# Patient Record
Sex: FEMALE | Race: WHITE | Employment: FULL TIME | URBAN - METROPOLITAN AREA
[De-identification: names, ages, dates, MRNs, and addresses within clinical notes are randomized per-mention and may not be internally consistent; named-entity substitution may affect disease eponyms.]

---
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
## 2021-04-02 ENCOUNTER — HOSPITAL ENCOUNTER (EMERGENCY)
Age: 21
Discharge: HOME | End: 2021-04-02
Attending: EMERGENCY MEDICINE
Payer: COMMERCIAL

## 2021-04-02 VITALS
BODY MASS INDEX: 30.39 KG/M2 | HEART RATE: 92 BPM | SYSTOLIC BLOOD PRESSURE: 127 MMHG | RESPIRATION RATE: 18 BRPM | DIASTOLIC BLOOD PRESSURE: 86 MMHG | HEIGHT: 64 IN | WEIGHT: 178 LBS | TEMPERATURE: 98.6 F | OXYGEN SATURATION: 100 %

## 2021-04-02 DIAGNOSIS — N30.00 ACUTE CYSTITIS WITHOUT HEMATURIA: ICD-10-CM

## 2021-04-02 DIAGNOSIS — R10.84 ABDOMINAL PAIN, GENERALIZED: Primary | ICD-10-CM

## 2021-04-02 DIAGNOSIS — R11.2 NAUSEA AND VOMITING, INTRACTABILITY OF VOMITING NOT SPECIFIED, UNSPECIFIED VOMITING TYPE: ICD-10-CM

## 2021-04-02 LAB
ALBUMIN SERPL-MCNC: 3.4 G/DL (ref 3.5–5)
ALBUMIN/GLOB SERPL: 0.7 {RATIO} (ref 1.1–2.2)
ALP SERPL-CCNC: 86 U/L (ref 45–117)
ALT SERPL-CCNC: 11 U/L (ref 12–78)
ANION GAP SERPL CALC-SCNC: 8 MMOL/L (ref 5–15)
APPEARANCE UR: ABNORMAL
AST SERPL W P-5'-P-CCNC: 9 U/L (ref 15–37)
BACTERIA URNS QL MICRO: ABNORMAL /HPF
BASOPHILS # BLD: 0 K/UL (ref 0–0.1)
BASOPHILS NFR BLD: 0 % (ref 0–1)
BILIRUB SERPL-MCNC: 0.6 MG/DL (ref 0.2–1)
BILIRUB UR QL: NEGATIVE
BUN SERPL-MCNC: 8 MG/DL (ref 6–20)
BUN/CREAT SERPL: 11 (ref 12–20)
CA-I BLD-MCNC: 9.3 MG/DL (ref 8.5–10.1)
CHLORIDE SERPL-SCNC: 107 MMOL/L (ref 97–108)
CO2 SERPL-SCNC: 22 MMOL/L (ref 21–32)
COLOR UR: ABNORMAL
CREAT SERPL-MCNC: 0.74 MG/DL (ref 0.55–1.02)
DIFFERENTIAL METHOD BLD: ABNORMAL
EOSINOPHIL # BLD: 0.1 K/UL (ref 0–0.4)
EOSINOPHIL NFR BLD: 1 % (ref 0–7)
ERYTHROCYTE [DISTWIDTH] IN BLOOD BY AUTOMATED COUNT: 13.4 % (ref 11.5–14.5)
GLOBULIN SER CALC-MCNC: 4.8 G/DL (ref 2–4)
GLUCOSE SERPL-MCNC: 96 MG/DL (ref 65–100)
GLUCOSE UR STRIP.AUTO-MCNC: NEGATIVE MG/DL
HCG SERPL QL: NEGATIVE
HCT VFR BLD AUTO: 39 % (ref 35–47)
HGB BLD-MCNC: 12.7 G/DL (ref 11.5–16)
HGB UR QL STRIP: ABNORMAL
HYALINE CASTS URNS QL MICRO: ABNORMAL /LPF (ref 0–5)
IMM GRANULOCYTES # BLD AUTO: 0 K/UL (ref 0–0.04)
IMM GRANULOCYTES NFR BLD AUTO: 0 % (ref 0–0.5)
KETONES UR QL STRIP.AUTO: NEGATIVE MG/DL
LEUKOCYTE ESTERASE UR QL STRIP.AUTO: ABNORMAL
LIPASE SERPL-CCNC: 74 U/L (ref 73–393)
LYMPHOCYTES # BLD: 0.9 K/UL (ref 0.8–3.5)
LYMPHOCYTES NFR BLD: 10 % (ref 12–49)
MAGNESIUM SERPL-MCNC: 2 MG/DL (ref 1.6–2.4)
MCH RBC QN AUTO: 25.8 PG (ref 26–34)
MCHC RBC AUTO-ENTMCNC: 32.6 G/DL (ref 30–36.5)
MCV RBC AUTO: 79.1 FL (ref 80–99)
MONOCYTES # BLD: 0.9 K/UL (ref 0–1)
MONOCYTES NFR BLD: 10 % (ref 5–13)
MUCOUS THREADS URNS QL MICRO: ABNORMAL /LPF
NEUTS SEG # BLD: 7.6 K/UL (ref 1.8–8)
NEUTS SEG NFR BLD: 79 % (ref 32–75)
NITRITE UR QL STRIP.AUTO: NEGATIVE
PH UR STRIP: 5 [PH] (ref 5–8)
PLATELET # BLD AUTO: 344 K/UL (ref 150–400)
PMV BLD AUTO: 9.3 FL (ref 8.9–12.9)
POTASSIUM SERPL-SCNC: 3.6 MMOL/L (ref 3.5–5.1)
PROT SERPL-MCNC: 8.2 G/DL (ref 6.4–8.2)
PROT UR STRIP-MCNC: NEGATIVE MG/DL
RBC # BLD AUTO: 4.93 M/UL (ref 3.8–5.2)
RBC #/AREA URNS HPF: ABNORMAL /HPF (ref 0–5)
SODIUM SERPL-SCNC: 137 MMOL/L (ref 136–145)
SP GR UR REFRACTOMETRY: 1.03 (ref 1–1.03)
UROBILINOGEN UR QL STRIP.AUTO: 2 EU/DL (ref 0.1–1)
WBC # BLD AUTO: 9.6 K/UL (ref 3.6–11)
WBC URNS QL MICRO: ABNORMAL /HPF (ref 0–4)

## 2021-04-02 PROCEDURE — 74011250636 HC RX REV CODE- 250/636: Performed by: EMERGENCY MEDICINE

## 2021-04-02 PROCEDURE — 74011000250 HC RX REV CODE- 250: Performed by: EMERGENCY MEDICINE

## 2021-04-02 PROCEDURE — 99283 EMERGENCY DEPT VISIT LOW MDM: CPT

## 2021-04-02 PROCEDURE — 96375 TX/PRO/DX INJ NEW DRUG ADDON: CPT

## 2021-04-02 PROCEDURE — 83735 ASSAY OF MAGNESIUM: CPT

## 2021-04-02 PROCEDURE — 96374 THER/PROPH/DIAG INJ IV PUSH: CPT

## 2021-04-02 PROCEDURE — 85025 COMPLETE CBC W/AUTO DIFF WBC: CPT

## 2021-04-02 PROCEDURE — 83690 ASSAY OF LIPASE: CPT

## 2021-04-02 PROCEDURE — 80053 COMPREHEN METABOLIC PANEL: CPT

## 2021-04-02 PROCEDURE — 84703 CHORIONIC GONADOTROPIN ASSAY: CPT

## 2021-04-02 PROCEDURE — 81001 URINALYSIS AUTO W/SCOPE: CPT

## 2021-04-02 PROCEDURE — 36415 COLL VENOUS BLD VENIPUNCTURE: CPT

## 2021-04-02 RX ORDER — CEPHALEXIN 500 MG/1
500 CAPSULE ORAL 4 TIMES DAILY
Qty: 28 CAP | Refills: 0 | Status: SHIPPED | OUTPATIENT
Start: 2021-04-02 | End: 2021-04-09

## 2021-04-02 RX ORDER — KETOROLAC TROMETHAMINE 30 MG/ML
30 INJECTION, SOLUTION INTRAMUSCULAR; INTRAVENOUS
Status: COMPLETED | OUTPATIENT
Start: 2021-04-02 | End: 2021-04-02

## 2021-04-02 RX ORDER — ONDANSETRON 4 MG/1
4 TABLET, ORALLY DISINTEGRATING ORAL
Qty: 10 TAB | Refills: 0 | Status: SHIPPED | OUTPATIENT
Start: 2021-04-02

## 2021-04-02 RX ORDER — ONDANSETRON 2 MG/ML
4 INJECTION INTRAMUSCULAR; INTRAVENOUS
Status: COMPLETED | OUTPATIENT
Start: 2021-04-02 | End: 2021-04-02

## 2021-04-02 RX ADMIN — CEFTRIAXONE SODIUM 1 G: 1 INJECTION, POWDER, FOR SOLUTION INTRAMUSCULAR; INTRAVENOUS at 08:31

## 2021-04-02 RX ADMIN — ONDANSETRON 4 MG: 2 INJECTION INTRAMUSCULAR; INTRAVENOUS at 06:35

## 2021-04-02 RX ADMIN — SODIUM CHLORIDE 1000 ML: 9 INJECTION, SOLUTION INTRAVENOUS at 06:35

## 2021-04-02 RX ADMIN — KETOROLAC TROMETHAMINE 30 MG: 30 INJECTION, SOLUTION INTRAMUSCULAR at 06:35

## 2021-04-02 NOTE — ED PROVIDER NOTES
EMERGENCY DEPARTMENT HISTORY AND PHYSICAL EXAM      Date: 4/2/2021  Patient Name: Mick Martin    History of Presenting Illness     Chief Complaint   Patient presents with    Vomiting       History Provided By: Patient    HPI: Mick Martin, 21 y.o. female with PMHx as noted below presents the emergency department chief complaint abdominal pain, nausea vomiting. Patient states she developed diffuse cramping abdominal pain yesterday evening. Pain is been constant but waxes and wanes in intensity. Pain does not radiate and there are no relieving or exacerbating factors to her pain. Patient notes persistent nausea and vomiting and inability to tolerate p.o. since yesterday. Patient is denying any diarrhea, urinary symptoms, fevers, chills. PCP: Lorraine Medina NP    Current Outpatient Medications   Medication Sig Dispense Refill    ondansetron (Zofran ODT) 4 mg disintegrating tablet Take 1 Tab by mouth every eight (8) hours as needed for Nausea. 10 Tab 0    ketorolac (TORADOL) 10 mg tablet Take 1 Tab by mouth every six (6) hours as needed for Pain. Indications: excruciating lower back pain from kidney stones 20 Tab 0    phenazopyridine (PYRIDIUM) 200 mg tablet Take 1 Tab by mouth three (3) times daily as needed for Pain. 20 Tab 1    dextroamphetamine/amphetamine (ADDERALL PO) Take  by mouth. Past History     Past Medical History:  History reviewed. No pertinent past medical history. Past Surgical History:  History reviewed. No pertinent surgical history. Family History:  History reviewed. No pertinent family history. Social History:  Social History     Tobacco Use    Smoking status: Never Smoker    Smokeless tobacco: Never Used   Substance Use Topics    Alcohol use: Never     Frequency: Never    Drug use: Never       Allergies:  No Known Allergies      Review of Systems   Review of Systems  Constitutional: Negative for fever, chills, and fatigue.    HENT: Negative for congestion, sore throat, rhinorrhea, sneezing and neck stiffness   Eyes: Negative for discharge and redness. Respiratory: Negative for  shortness of breath, wheezing   Cardiovascular: Negative for chest pain, palpitations   Gastrointestinal: Positive for nausea, vomiting, abdominal pain. Negative constipation, diarrhea and blood in stool. Genitourinary: Negative for dysuria, hematuria, flank pain, decreased urine volume, discharge,   Musculoskeletal: Negative for myalgias or joint pain . Skin: Negative for rash or lesions . Neurological: Negative weakness, light-headedness, numbness and headaches. Physical Exam   Physical Exam    GENERAL: alert and oriented, no acute distress  EYES: PEERL, No injection, discharge or icterus. ENT: Mucous membranes pink and moist.  NECK: Supple  LUNGS: Airway patent. Non-labored respirations. Breath sounds clear with good air entry bilaterally. HEART: Tachycardic, regular rhythm. . No peripheral edema  ABDOMEN: Non-distended and non-tender, without guarding or rebound. SKIN:  warm, dry  MSK/EXTREMITIES: Without swelling, tenderness or deformity, symmetric with normal ROM  NEUROLOGICAL: Alert, oriented      Diagnostic Study Results     Labs -  Reviewed    Radiologic Studies -   No orders to display     CT Results  (Last 48 hours)    None        CXR Results  (Last 48 hours)    None            Medical Decision Making     ICorey MD am the first provider for this patient and am the attending of record for this patient encounter. I reviewed the vital signs, available nursing notes, past medical history, past surgical history, family history and social history. Vital Signs-Reviewed the patient's vital signs.   Patient Vitals for the past 12 hrs:   Temp Pulse Resp BP SpO2   04/02/21 0559 98.6 °F (37 °C) (!) 126 18 127/79 98 %       Records Reviewed: Nursing Notes and Old Medical Records    Provider Notes (Medical Decision Making):   Patient presents to the emergency room for abd pain, vomiting and diarrhea. On presentation, the patient is well appearing, in no acute distress with normal vital signs Differential diagnosis considered includes gastroenteritis, hyperemesis, ectopic pregnancy, gastritis, enteritis, inflammatory bowel disease, bowel obstruction, pancreatitis, cholecystitis, appendicitis,  diverticulitis, cyclic vomiting syndrome, or a food-borne illness. Abdominal examination was soft and benign so no concern for any acute surgical issues at presentation. Clinical history, examination, and work-up are consistent with a likely self-limited gastrointestinal process. .      Patient was provided IV hydration and anti-emetics with overall improvement in symptoms. The patient was able to tolerate oral intake prior to discharge. me.  Patient is encouraged to maintain hydration. A prescription for anti-emetics was provided. CT scan was not felt to be warranted at this time as exam remained benign on repeat. .  Patient should return for severe pain, intractable vomiting, fevers, bloody stools, or dehydration. The patient understood the diagnosis and treatment and had no further questions. Patient should call a primary care physician for follow-up, and was otherwise deemed stable for discharge to home. ED Course:   Initial assessment performed. The patients presenting problems have been discussed, and they are in agreement with the care plan formulated and outlined with them. I have encouraged them to ask questions as they arise throughout their visit. PROGRESS  Judi Jenna Fernandez's  results have been reviewed with her. She has been counseled regarding her diagnosis. She verbally conveys understanding and agreement of the signs, symptoms, diagnosis, treatment and prognosis and additionally agrees to follow up as recommended with Dr. Maria Esther Farley NP in 24 - 48 hours. She also agrees with the care-plan and conveys that all of her questions have been answered. I have also put together some discharge instructions for her that include: 1) educational information regarding their diagnosis, 2) how to care for their diagnosis at home, as well a 3) list of reasons why they would want to return to the ED prior to their follow-up appointment, should their condition change. Disposition:  home    PLAN:  1. Current Discharge Medication List      CONTINUE these medications which have CHANGED    Details   ondansetron (Zofran ODT) 4 mg disintegrating tablet Take 1 Tab by mouth every eight (8) hours as needed for Nausea. Qty: 10 Tab, Refills: 0           2. Follow-up Information     Follow up With Specialties Details Why [address] [facility] EMERGENCY DEPT Emergency Medicine  If symptoms worsen [address] [facility] [address] [address] [place] [address], [facility]5 [address]  [phone]          Return to ED if worse     Diagnosis     Clinical Impression:   1. Abdominal pain, generalized    2. Nausea and vomiting, intractability of vomiting not specified, unspecified vomiting type        Please note that this dictation was completed with Dragon, computer voice recognition software. Quite often unanticipated grammatical, syntax, homophones, and other interpretive errors are inadvertently transcribed by the computer software. Please disregard these errors. Additionally, please excuse any errors that have escaped final proofreading.

## 2021-04-02 NOTE — ED TRIAGE NOTES
Patient states that she has nausea and vomiting for the last day, states that she has been non stop vomiting since 2200 last night. Also reports abdominal pain but denies diarrhea.

## 2021-04-02 NOTE — ED NOTES
[facility] 15 all medications, side effects and f/u appts explained with pt's verbal understanding given. All personal belongings taken.

## 2021-04-08 ENCOUNTER — TRANSCRIBE ORDER (OUTPATIENT)
Dept: SCHEDULING | Age: 21
End: 2021-04-08

## 2021-04-08 DIAGNOSIS — N39.0 UTI (URINARY TRACT INFECTION): Primary | ICD-10-CM

## 2023-03-03 ENCOUNTER — TELEPHONE (OUTPATIENT)
Dept: OBGYN CLINIC | Age: 23
End: 2023-03-03

## 2023-03-03 NOTE — TELEPHONE ENCOUNTER
Patient given information regarding [id number] and contacting her insurance to see if the test is covered. She was advised if it is she may discuss this with Dr Neftali Simeon at her visit.

## 2023-03-22 ENCOUNTER — INITIAL PRENATAL (OUTPATIENT)
Dept: OBGYN CLINIC | Age: 23
End: 2023-03-22

## 2023-03-22 VITALS
WEIGHT: 215.13 LBS | SYSTOLIC BLOOD PRESSURE: 138 MMHG | DIASTOLIC BLOOD PRESSURE: 87 MMHG | HEIGHT: 64 IN | BODY MASS INDEX: 36.73 KG/M2

## 2023-03-22 DIAGNOSIS — N91.2 AMENORRHEA: ICD-10-CM

## 2023-03-22 DIAGNOSIS — Z11.3 SCREENING FOR VENEREAL DISEASE: ICD-10-CM

## 2023-03-22 DIAGNOSIS — Z3A.11 11 WEEKS GESTATION OF PREGNANCY: ICD-10-CM

## 2023-03-22 DIAGNOSIS — F90.0 ATTENTION DEFICIT HYPERACTIVITY DISORDER (ADHD), PREDOMINANTLY INATTENTIVE TYPE: ICD-10-CM

## 2023-03-22 DIAGNOSIS — Z11.51 SCREENING FOR HUMAN PAPILLOMAVIRUS: Primary | ICD-10-CM

## 2023-03-22 DIAGNOSIS — Z34.91 NORMAL PREGNANCY IN FIRST TRIMESTER: ICD-10-CM

## 2023-03-22 PROBLEM — Z34.90 PREGNANCY: Status: ACTIVE | Noted: 2023-03-22

## 2023-03-22 PROBLEM — Z34.90 NORMAL PREGNANCY: Status: ACTIVE | Noted: 2023-03-22

## 2023-03-22 RX ORDER — DEXTROAMPHETAMINE SACCHARATE, AMPHETAMINE ASPARTATE MONOHYDRATE, DEXTROAMPHETAMINE SULFATE AND AMPHETAMINE SULFATE 3.75; 3.75; 3.75; 3.75 MG/1; MG/1; MG/1; MG/1
15 CAPSULE, EXTENDED RELEASE ORAL
Qty: 30 CAPSULE | Refills: 0 | Status: SHIPPED | OUTPATIENT
Start: 2023-03-22

## 2023-03-22 NOTE — PROGRESS NOTES
Ketones:NEG // SG:NEG //  Cristhian Panning //  Pro:NEG //  Nit:NEG // Leuk: NEG   Pt presents for NEW OB with complaints of occasional nausea and heartburn//OB PAP, OB PANEL, [id number] TODAY//Aj

## 2023-03-23 LAB
ABO GROUP BLD: NORMAL
BASOPHILS # BLD AUTO: 0 X10E3/UL (ref 0–0.2)
BASOPHILS NFR BLD AUTO: 0 %
BLD GP AB SCN SERPL QL: NEGATIVE
EOSINOPHIL # BLD AUTO: 0.2 X10E3/UL (ref 0–0.4)
EOSINOPHIL NFR BLD AUTO: 2 %
ERYTHROCYTE [DISTWIDTH] IN BLOOD BY AUTOMATED COUNT: 12.5 % (ref 11.7–15.4)
HBV SURFACE AG SERPL QL IA: NEGATIVE
HCT VFR BLD AUTO: 38.2 % (ref 34–46.6)
HCV AB SERPL QL IA: NORMAL
HCV IGG SERPL QL IA: NON REACTIVE
HGB BLD-MCNC: 12.6 G/DL (ref 11.1–15.9)
HIV 1+2 AB+HIV1 P24 AG SERPL QL IA: NON REACTIVE
IMM GRANULOCYTES # BLD AUTO: 0.1 X10E3/UL (ref 0–0.1)
IMM GRANULOCYTES NFR BLD AUTO: 1 %
LYMPHOCYTES # BLD AUTO: 1.8 X10E3/UL (ref 0.7–3.1)
LYMPHOCYTES NFR BLD AUTO: 17 %
MCH RBC QN AUTO: 26.6 PG (ref 26.6–33)
MCHC RBC AUTO-ENTMCNC: 33 G/DL (ref 31.5–35.7)
MCV RBC AUTO: 81 FL (ref 79–97)
MONOCYTES # BLD AUTO: 0.8 X10E3/UL (ref 0.1–0.9)
MONOCYTES NFR BLD AUTO: 7 %
NEUTROPHILS # BLD AUTO: 7.5 X10E3/UL (ref 1.4–7)
NEUTROPHILS NFR BLD AUTO: 73 %
PLATELET # BLD AUTO: 368 X10E3/UL (ref 150–450)
RBC # BLD AUTO: 4.73 X10E6/UL (ref 3.77–5.28)
RH BLD: POSITIVE
RPR SER QL: NON REACTIVE
RUBV IGG SERPL IA-ACNC: 1.21 INDEX
WBC # BLD AUTO: 10.2 X10E3/UL (ref 3.4–10.8)

## 2023-03-27 LAB
C TRACH RRNA CVX QL NAA+PROBE: NEGATIVE
CYTOLOGIST CVX/VAG CYTO: NORMAL
CYTOLOGY CVX/VAG DOC CYTO: NORMAL
CYTOLOGY CVX/VAG DOC THIN PREP: NORMAL
DX ICD CODE: NORMAL
LABCORP, 190119: NORMAL
Lab: NORMAL
N GONORRHOEA RRNA CVX QL NAA+PROBE: NEGATIVE
OTHER STN SPEC: NORMAL
STAT OF ADQ CVX/VAG CYTO-IMP: NORMAL
T VAGINALIS RRNA SPEC QL NAA+PROBE: NEGATIVE

## 2023-03-28 LAB

## 2023-04-23 DIAGNOSIS — N91.2 AMENORRHEA: Primary | ICD-10-CM

## 2023-04-25 ENCOUNTER — ROUTINE PRENATAL (OUTPATIENT)
Dept: OBGYN CLINIC | Age: 23
End: 2023-04-25
Payer: COMMERCIAL

## 2023-04-25 VITALS
BODY MASS INDEX: 36.6 KG/M2 | SYSTOLIC BLOOD PRESSURE: 146 MMHG | WEIGHT: 214.38 LBS | HEIGHT: 64 IN | DIASTOLIC BLOOD PRESSURE: 89 MMHG

## 2023-04-25 DIAGNOSIS — O21.9 NAUSEA AND VOMITING IN PREGNANCY: Primary | ICD-10-CM

## 2023-04-25 DIAGNOSIS — Z34.92 NORMAL PREGNANCY IN SECOND TRIMESTER: ICD-10-CM

## 2023-04-25 DIAGNOSIS — G43.509 PERSISTENT MIGRAINE AURA WITHOUT CEREBRAL INFARCTION AND WITHOUT STATUS MIGRAINOSUS, NOT INTRACTABLE: ICD-10-CM

## 2023-04-25 DIAGNOSIS — Z3A.16 16 WEEKS GESTATION OF PREGNANCY: ICD-10-CM

## 2023-04-25 DIAGNOSIS — O21.9 NAUSEA AND VOMITING IN PREGNANCY: ICD-10-CM

## 2023-04-25 PROCEDURE — 0502F SUBSEQUENT PRENATAL CARE: CPT | Performed by: OBSTETRICS & GYNECOLOGY

## 2023-04-25 RX ORDER — SCOLOPAMINE TRANSDERMAL SYSTEM 1 MG/1
1 PATCH, EXTENDED RELEASE TRANSDERMAL
Qty: 5 PATCH | Refills: 2 | Status: SHIPPED | OUTPATIENT
Start: 2023-04-25 | End: 2023-04-25 | Stop reason: SDUPTHER

## 2023-04-25 RX ORDER — BUTALBITAL, ACETAMINOPHEN AND CAFFEINE 50; 325; 40 MG/1; MG/1; MG/1
1 TABLET ORAL
Qty: 30 TABLET | Refills: 0 | Status: SHIPPED | OUTPATIENT
Start: 2023-04-25 | End: 2023-04-25 | Stop reason: SDUPTHER

## 2023-04-25 RX ORDER — PROMETHAZINE HYDROCHLORIDE 25 MG/1
25 TABLET ORAL
Qty: 30 TABLET | Refills: 3 | Status: SHIPPED | OUTPATIENT
Start: 2023-04-25 | End: 2023-04-25 | Stop reason: SDUPTHER

## 2023-04-25 RX ORDER — BUTALBITAL, ACETAMINOPHEN AND CAFFEINE 50; 325; 40 MG/1; MG/1; MG/1
1 TABLET ORAL
Qty: 30 TABLET | Refills: 0 | Status: SHIPPED | OUTPATIENT
Start: 2023-04-25

## 2023-04-25 RX ORDER — PROMETHAZINE HYDROCHLORIDE 25 MG/1
25 TABLET ORAL
Qty: 30 TABLET | Refills: 3 | Status: SHIPPED | OUTPATIENT
Start: 2023-04-25

## 2023-04-25 RX ORDER — SCOLOPAMINE TRANSDERMAL SYSTEM 1 MG/1
1 PATCH, EXTENDED RELEASE TRANSDERMAL
Qty: 5 PATCH | Refills: 2 | Status: SHIPPED | OUTPATIENT
Start: 2023-04-25

## 2023-04-25 NOTE — PROGRESS NOTES
Ketones:NEG // SG:NEG //  Merrill Meek //  Pro:NEG //  Nit:NEG // Leuk: NEG   Pt presents with complaints of round ligament pain, sciatica pain and daily headaches with nausea and vomiting//[id number] GALLITO//Aj

## 2023-05-18 DIAGNOSIS — O99.212 OBESITY AFFECTING PREGNANCY IN SECOND TRIMESTER: Primary | ICD-10-CM

## 2023-05-21 ENCOUNTER — HOSPITAL ENCOUNTER (EMERGENCY)
Facility: HOSPITAL | Age: 23
Discharge: HOME | End: 2023-05-21
Payer: COMMERCIAL

## 2023-05-21 VITALS
RESPIRATION RATE: 20 BRPM | HEIGHT: 64 IN | TEMPERATURE: 97.9 F | WEIGHT: 214 LBS | OXYGEN SATURATION: 98 % | BODY MASS INDEX: 36.54 KG/M2 | HEART RATE: 125 BPM | DIASTOLIC BLOOD PRESSURE: 81 MMHG | SYSTOLIC BLOOD PRESSURE: 138 MMHG

## 2023-05-21 DIAGNOSIS — J40 BRONCHITIS: Primary | ICD-10-CM

## 2023-05-21 DIAGNOSIS — J30.1 SEASONAL ALLERGIC RHINITIS DUE TO POLLEN: ICD-10-CM

## 2023-05-21 DIAGNOSIS — Z3A.20 20 WEEKS GESTATION OF PREGNANCY: ICD-10-CM

## 2023-05-21 PROCEDURE — 6360000002 HC RX W HCPCS: Performed by: NURSE PRACTITIONER

## 2023-05-21 PROCEDURE — 99284 EMERGENCY DEPT VISIT MOD MDM: CPT

## 2023-05-21 PROCEDURE — 96372 THER/PROPH/DIAG INJ SC/IM: CPT

## 2023-05-21 PROCEDURE — 6370000000 HC RX 637 (ALT 250 FOR IP): Performed by: NURSE PRACTITIONER

## 2023-05-21 RX ORDER — CEFTRIAXONE 1 G/1
1000 INJECTION, POWDER, FOR SOLUTION INTRAMUSCULAR; INTRAVENOUS ONCE
Status: COMPLETED | OUTPATIENT
Start: 2023-05-21 | End: 2023-05-21

## 2023-05-21 RX ORDER — AZITHROMYCIN 250 MG/1
TABLET, FILM COATED ORAL
Qty: 6 TABLET | Refills: 0 | Status: SHIPPED | OUTPATIENT
Start: 2023-05-21

## 2023-05-21 RX ORDER — PREDNISONE 20 MG/1
20 TABLET ORAL 2 TIMES DAILY
Qty: 10 TABLET | Refills: 0 | Status: SHIPPED | OUTPATIENT
Start: 2023-05-21 | End: 2023-05-26

## 2023-05-21 RX ORDER — IPRATROPIUM BROMIDE AND ALBUTEROL SULFATE 2.5; .5 MG/3ML; MG/3ML
1 SOLUTION RESPIRATORY (INHALATION)
Status: COMPLETED | OUTPATIENT
Start: 2023-05-21 | End: 2023-05-21

## 2023-05-21 RX ORDER — DEXAMETHASONE SODIUM PHOSPHATE 10 MG/ML
10 INJECTION, SOLUTION INTRAMUSCULAR; INTRAVENOUS ONCE
Status: COMPLETED | OUTPATIENT
Start: 2023-05-21 | End: 2023-05-21

## 2023-05-21 RX ORDER — DEXTROMETHORPHAN HYDROBROMIDE AND PROMETHAZINE HYDROCHLORIDE 15; 6.25 MG/5ML; MG/5ML
10 SYRUP ORAL
Qty: 70 ML | Refills: 0 | Status: SHIPPED | OUTPATIENT
Start: 2023-05-21 | End: 2023-05-28

## 2023-05-21 RX ORDER — CETIRIZINE HYDROCHLORIDE 10 MG/1
10 TABLET ORAL DAILY
Qty: 30 TABLET | Refills: 0 | Status: SHIPPED | OUTPATIENT
Start: 2023-05-21 | End: 2023-06-20

## 2023-05-21 RX ORDER — ALBUTEROL SULFATE 90 UG/1
2 AEROSOL, METERED RESPIRATORY (INHALATION) EVERY 6 HOURS PRN
Qty: 18 G | Refills: 3 | Status: SHIPPED | OUTPATIENT
Start: 2023-05-21

## 2023-05-21 RX ORDER — FLUTICASONE PROPIONATE 50 MCG
2 SPRAY, SUSPENSION (ML) NASAL DAILY
Qty: 16 G | Refills: 0 | Status: SHIPPED | OUTPATIENT
Start: 2023-05-21

## 2023-05-21 RX ADMIN — DEXAMETHASONE SODIUM PHOSPHATE 10 MG: 10 INJECTION, SOLUTION INTRAMUSCULAR; INTRAVENOUS at 20:44

## 2023-05-21 RX ADMIN — CEFTRIAXONE SODIUM 1000 MG: 1 INJECTION, POWDER, FOR SOLUTION INTRAMUSCULAR; INTRAVENOUS at 20:56

## 2023-05-21 RX ADMIN — IPRATROPIUM BROMIDE AND ALBUTEROL SULFATE 1 AMPULE: 2.5; .5 SOLUTION RESPIRATORY (INHALATION) at 20:44

## 2023-05-21 ASSESSMENT — PAIN - FUNCTIONAL ASSESSMENT: PAIN_FUNCTIONAL_ASSESSMENT: NONE - DENIES PAIN

## 2023-05-22 ENCOUNTER — TELEPHONE (OUTPATIENT)
Age: 23
End: 2023-05-22

## 2023-05-22 NOTE — ED PROVIDER NOTES
[facility] EMERGENCY DEPARTMENT  EMERGENCY DEPARTMENT HISTORY AND PHYSICAL EXAM      Date: 5/21/2023  Patient Name: Ambrose Wallis  MRN: [id number]  [place]: 2000  Date of evaluation: 5/21/2023  Provider: MARTITA Harris NP   Note Started: 9:28 PM EDT 5/21/23    HISTORY OF PRESENT ILLNESS     Chief Complaint   Patient presents with    Cough    Nasal Congestion       History Provided By: Patient    HPI: Ambrose Wallis is a 25 y.o. female ***    PAST MEDICAL HISTORY   Past Medical History:  Past Medical History:   Diagnosis Date    Calculus of kidney     Headache     PCOS (polycystic ovarian syndrome)        Past Surgical History:  History reviewed. No pertinent surgical history. Family History:  History reviewed. No pertinent family history. Social History:  Social History     Tobacco Use    Smoking status: Never    Smokeless tobacco: Never   Substance Use Topics    Alcohol use: Never    Drug use: Never       Allergies:  No Known Allergies    PCP: MARTITA Alford NP    Current Meds:   No current facility-administered medications for this encounter.      Current Outpatient Medications   Medication Sig Dispense Refill    predniSONE (DELTASONE) 20 MG tablet Take 1 tablet by mouth 2 times daily for 5 days 10 tablet 0    azithromycin (ZITHROMAX) 250 MG tablet 2 tablets today, then 1 tablet daily 6 tablet 0    albuterol sulfate HFA (PROVENTIL HFA) 108 (90 Base) MCG/ACT inhaler Inhale 2 puffs into the lungs every 6 hours as needed for Wheezing 18 g 3    promethazine-dextromethorphan (PROMETHAZINE-DM) 6.25-15 MG/5ML syrup Take 10 mLs by mouth nightly for 7 days 70 mL 0    fluticasone (FLONASE) 50 MCG/ACT nasal spray 2 sprays by Each Nostril route daily 16 g 0    cetirizine (ZYRTEC) 10 MG tablet Take 1 tablet by mouth daily 30 tablet 0    ketorolac (TORADOL) 10 MG tablet Take 10 mg by mouth every 6 hours as needed      ondansetron (ZOFRAN-ODT) 4 MG disintegrating tablet Take 4 mg by mouth every 8 hours as

## 2023-05-22 NOTE — TELEPHONE ENCOUNTER
I called the patient on 05/22 @3:34 PM. We need to cancel her Ultrasound Anatomy appointment in the office as she needs to see MFM.  Her Mailbox is full and unable to get in contact with the patient

## 2023-05-24 ENCOUNTER — HOSPITAL ENCOUNTER (OUTPATIENT)
Facility: HOSPITAL | Age: 23
Discharge: HOME | End: 2023-05-27
Payer: COMMERCIAL

## 2023-05-24 PROCEDURE — 76811 OB US DETAILED SNGL FETUS: CPT | Performed by: OBSTETRICS & GYNECOLOGY

## 2023-05-24 NOTE — PROCEDURES
Indication  ========    Obesity, BMI 36    Method  ======    Transabdominal ultrasound examination. View: Sufficient    Dating  ======    Previous Ultrasound on: 3/22/2023  Type of prior assessment: GA  GA at prior assessment date 11 w + 3 d  GA by previous U/S 20 w + 3 d  ANGEL by previous Ultrasound: 10/8/2023  Ultrasound examination on: 5/24/2023  GA by U/S based upon: [facility], BPD, Femur, HC  GA by U/S 20 w + 5 d  ANGEL by U/S: 10/6/2023  Assigned: based on ultrasound (GA), selected on 05/24/2023  Assigned GA 20 w + 3 d  Assigned ANGEL: 10/8/2023    Fetal Biometry  ============    Standard  BPD 47.4 mm 20w 2d 45% Hadlock  OFD 64.2 mm 21w 6d 90% Nelli  .0 mm 20w 2d 38% Hadlock  Cerebellum tr 21.8 mm 20w 4d 74% Hill  Nuchal fold 3.1 mm  .2 mm 21w 0d 62% Hadlock  Femur 35.3 mm 21w 1d 67% Hadlock  Humerus 33.8 mm 21w 3d 85% Nelli   g 20w 6d 72% Hadlock  EFW (lb) 0 lb  EFW (oz) 14 oz  EFW by: Hadlock (BPD-HC-AC-FL)  Extended   7.1 mm  CM 5.9 mm  74% Nicolaides  Head / Face / Neck  Nasal bone: present  Other Structures   bpm    General Evaluation  ==============    Cardiac activity present.  bpm. Fetal movements: visualized. Presentation: transverse  Placenta: Placental site: posterior  Umbilical cord: Cord vessels: 3 vessel cord. Insertion site: normal insertion  Amniotic fluid: Amount of AF: normal amount.  MVP 5.1 cm    Fetal Anatomy  ===========    Cranium: normal  Lateral ventricles: normal  Choroid plexus: normal  Midline falx: normal  Cavum septi pellucidi: normal  Cerebellum: normal  Cisterna magna: normal  Head / Neck  Neck: suboptimal  Lips: normal  Profile: suboptimal  Nose: normal  Face  Coronal face: normal  Nasal bone: present  Palate: normal  Maxilla: normal  Mandible: normal  Orbits: normal  4-chamber view: not visualized  RVOT view: not visualized  LVOT view: not visualized  3-vessel view: not visualized  3-vessel-trachea view: not visualized  Heart / Thorax  Aortic arch

## 2023-05-30 ASSESSMENT — ENCOUNTER SYMPTOMS
ALLERGIC/IMMUNOLOGIC NEGATIVE: 1
CHEST TIGHTNESS: 0
SHORTNESS OF BREATH: 0
EYES NEGATIVE: 1
COUGH: 1
SINUS PAIN: 1
WHEEZING: 0
SORE THROAT: 1
ABDOMINAL PAIN: 0
GASTROINTESTINAL NEGATIVE: 1
RHINORRHEA: 1

## 2023-05-31 RX ORDER — DEXTROAMPHETAMINE SACCHARATE, AMPHETAMINE ASPARTATE MONOHYDRATE, DEXTROAMPHETAMINE SULFATE AND AMPHETAMINE SULFATE 3.75; 3.75; 3.75; 3.75 MG/1; MG/1; MG/1; MG/1
CAPSULE, EXTENDED RELEASE ORAL
COMMUNITY
Start: 2023-03-22

## 2023-05-31 RX ORDER — SCOLOPAMINE TRANSDERMAL SYSTEM 1 MG/1
PATCH, EXTENDED RELEASE TRANSDERMAL
COMMUNITY
Start: 2023-04-25

## 2023-05-31 RX ORDER — PROMETHAZINE HYDROCHLORIDE 25 MG/1
TABLET ORAL
COMMUNITY
Start: 2023-04-25

## 2023-05-31 RX ORDER — BUTALBITAL, ACETAMINOPHEN AND CAFFEINE 50; 325; 40 MG/1; MG/1; MG/1
TABLET ORAL
COMMUNITY
Start: 2023-04-25

## 2023-05-31 SDOH — ECONOMIC STABILITY: HOUSING INSECURITY
IN THE LAST 12 MONTHS, WAS THERE A TIME WHEN YOU DID NOT HAVE A STEADY PLACE TO SLEEP OR SLEPT IN A SHELTER (INCLUDING NOW)?: NO

## 2023-05-31 SDOH — ECONOMIC STABILITY: FOOD INSECURITY: WITHIN THE PAST 12 MONTHS, YOU WORRIED THAT YOUR FOOD WOULD RUN OUT BEFORE YOU GOT MONEY TO BUY MORE.: NEVER TRUE

## 2023-05-31 SDOH — ECONOMIC STABILITY: TRANSPORTATION INSECURITY
IN THE PAST 12 MONTHS, HAS LACK OF TRANSPORTATION KEPT YOU FROM MEETINGS, WORK, OR FROM GETTING THINGS NEEDED FOR DAILY LIVING?: NO

## 2023-05-31 SDOH — ECONOMIC STABILITY: INCOME INSECURITY: HOW HARD IS IT FOR YOU TO PAY FOR THE VERY BASICS LIKE FOOD, HOUSING, MEDICAL CARE, AND HEATING?: NOT VERY HARD

## 2023-05-31 SDOH — ECONOMIC STABILITY: FOOD INSECURITY: WITHIN THE PAST 12 MONTHS, THE FOOD YOU BOUGHT JUST DIDN'T LAST AND YOU DIDN'T HAVE MONEY TO GET MORE.: NEVER TRUE

## 2023-06-01 ENCOUNTER — ROUTINE PRENATAL (OUTPATIENT)
Age: 23
End: 2023-06-01

## 2023-06-01 VITALS
SYSTOLIC BLOOD PRESSURE: 145 MMHG | WEIGHT: 226.25 LBS | BODY MASS INDEX: 38.63 KG/M2 | DIASTOLIC BLOOD PRESSURE: 84 MMHG | HEIGHT: 64 IN

## 2023-06-01 DIAGNOSIS — O09.92 HIGH-RISK PREGNANCY IN SECOND TRIMESTER: ICD-10-CM

## 2023-06-01 DIAGNOSIS — Z3A.21 21 WEEKS GESTATION OF PREGNANCY: ICD-10-CM

## 2023-06-01 DIAGNOSIS — O16.2 HYPERTENSION AFFECTING PREGNANCY IN SECOND TRIMESTER: Primary | ICD-10-CM

## 2023-06-01 PROCEDURE — 0502F SUBSEQUENT PRENATAL CARE: CPT | Performed by: OBSTETRICS & GYNECOLOGY

## 2023-06-01 NOTE — PROGRESS NOTES
Ketones:NEG // Logan Flattery //  Peewee Pulling //  Pro:NEG //  Nit:NEG // Leuk: NEG   Pt presents with no new ob complaints//GLUCOLA NEXT//Carolyn

## 2023-06-14 ENCOUNTER — TELEPHONE (OUTPATIENT)
Age: 23
End: 2023-06-14

## 2023-06-14 NOTE — TELEPHONE ENCOUNTER
Patient was called back from voicemail message left. Patient is aware of her appointment at [facility] for her ultrasound. She needs to go to this appointment nurse said ,Due to they have a more high tech ultrasound.

## 2023-06-20 ENCOUNTER — HOSPITAL ENCOUNTER (OUTPATIENT)
Facility: HOSPITAL | Age: 23
Discharge: HOME | End: 2023-06-23
Payer: COMMERCIAL

## 2023-06-20 PROCEDURE — 76816 OB US FOLLOW-UP PER FETUS: CPT | Performed by: OBSTETRICS & GYNECOLOGY

## 2023-06-21 NOTE — PROCEDURES
Indication  ========    Obesity, BMI 36    Method  ======    Transabdominal ultrasound examination. View: Suboptimal view: limited by maternal body habitus    Pregnancy  =========    Tidwell pregnancy. Number of fetuses: 1    Dating  ======    Previous Ultrasound on: 3/22/2023  Type of prior assessment: GA  GA at prior assessment date 11 w + 3 d  GA by previous U/S 24 w + 2 d  ANGEL by previous Ultrasound: 10/8/2023  Ultrasound examination on: 6/20/2023  GA by U/S based upon: [facility], BPD, Femur, HC  GA by U/S 25 w + 0 d  ANGEL by U/S: 10/3/2023  Assigned: based on ultrasound (GA), selected on 05/24/2023  Assigned GA 24 w + 2 d  Assigned ANGEL: 10/8/2023    Fetal Biometry  ============    Standard  BPD 60.9 mm 24w 5d 61% Hadlock  OFD 84.6 mm 27w 3d >99% Nelli  .8 mm 25w 2d 70% Hadlock  .6 mm 24w 4d 48% Hadlock  Femur 46.1 mm 25w 2d 70% Hadlock   g 24w 5d 68% Hadlock  EFW (lb) 1 lb  EFW (oz) 10 oz  EFW by: Hadlock (BPD-HC-AC-FL)  Extended   10.0 mm  Other Structures   bpm    General Evaluation  ==============    Cardiac activity present.  bpm. Fetal movements: visualized. Presentation: cephalic  Placenta: posterior  Umbilical cord: Cord vessels: 3 vessel cord. Insertion site: normal insertion  Amniotic fluid: Amount of AF: normal amount.  MVP 4.0 cm    Fetal Anatomy  ===========    Lateral ventricles: details  Head / Neck  Neck: normal  Profile: normal  4-chamber view: normal  RVOT view: normal  LVOT view: normal  3-vessel view: normal  3-vessel-trachea view: normal  Heart / Thorax  Situs: situs solitus (normal)  Aortic arch view: normal  Bicaval view: normal  Ductal arch view: normal  Interventricular septum: normal  Diaphragm: normal  Stomach: normal  Kidneys: normal  Bladder: normal  Rt hand: normal  Rt fingers: normal  Lt hand: normal  Lt fingers: normal  Fetal sex: male    Maternal Structures  ===============    Uterus / Cervix  Cervical length 4.49 cm    Findings  =======    Follow

## 2023-07-10 ENCOUNTER — ROUTINE PRENATAL (OUTPATIENT)
Age: 23
End: 2023-07-10

## 2023-07-10 VITALS
HEIGHT: 64 IN | OXYGEN SATURATION: 99 % | WEIGHT: 237.38 LBS | DIASTOLIC BLOOD PRESSURE: 85 MMHG | SYSTOLIC BLOOD PRESSURE: 133 MMHG | HEART RATE: 103 BPM | RESPIRATION RATE: 16 BRPM | BODY MASS INDEX: 40.52 KG/M2

## 2023-07-10 DIAGNOSIS — O35.09X0 PREGNANCY COMPLICATED BY FETAL CEREBRAL VENTRICULOMEGALY, SINGLE OR UNSPECIFIED FETUS: ICD-10-CM

## 2023-07-10 DIAGNOSIS — Z34.92 NORMAL PREGNANCY IN SECOND TRIMESTER: ICD-10-CM

## 2023-07-10 DIAGNOSIS — Z3A.27 27 WEEKS GESTATION OF PREGNANCY: ICD-10-CM

## 2023-07-10 DIAGNOSIS — Z34.02 ENCOUNTER FOR SUPERVISION OF NORMAL FIRST PREGNANCY IN SECOND TRIMESTER: Primary | ICD-10-CM

## 2023-07-10 PROCEDURE — 0502F SUBSEQUENT PRENATAL CARE: CPT | Performed by: OBSTETRICS & GYNECOLOGY

## 2023-07-11 LAB — GLUCOSE 1H P 50 G GLC PO SERPL-MCNC: 150 MG/DL (ref 70–139)

## 2023-07-13 DIAGNOSIS — O99.810 ABNORMAL GLUCOSE TOLERANCE AFFECTING PREGNANCY, ANTEPARTUM: Primary | ICD-10-CM

## 2023-07-19 LAB
GLUCOSE 1H P 100 G GLC PO SERPL-MCNC: 156 MG/DL (ref 70–179)
GLUCOSE 2H P 100 G GLC PO SERPL-MCNC: 135 MG/DL (ref 70–154)
GLUCOSE 3H P 100 G GLC PO SERPL-MCNC: 80 MG/DL (ref 70–139)
GLUCOSE P FAST SERPL-MCNC: 89 MG/DL (ref 70–94)
Lab: NORMAL

## 2023-07-20 ENCOUNTER — HOSPITAL ENCOUNTER (OUTPATIENT)
Facility: HOSPITAL | Age: 23
Discharge: HOME | End: 2023-07-23
Payer: COMMERCIAL

## 2023-07-20 PROCEDURE — 76816 OB US FOLLOW-UP PER FETUS: CPT | Performed by: OBSTETRICS & GYNECOLOGY

## 2023-07-27 NOTE — PROCEDURES
PATIENT: STEVE GRANADO   -  : 2000   -  DOS:2023   -  INTERPRETING PROVIDER:Liudmila Dominguez,   Indication  ========    Obesity, BMI 36    Maternal Assessment  =================    Weight 108 kg  Weight (lb) 239 lb  BP syst 130 mmHg  BP diast 84 mmHg  Heart rate 104 bpm    Method  ======    Transabdominal ultrasound examination. View: Sufficient    Pregnancy  =========    [place]well pregnancy. Number of fetuses: 1    Dating  ======    Previous Ultrasound on: 3/22/2023  Type of prior assessment: GA  GA at prior assessment date 11 w + 3 d  GA by previous U/S 28 w + 4 d  ANGEL by previous Ultrasound: 10/8/2023  Ultrasound examination on: 2023  GA by U/S based upon: [facility], BPD, Femur, HC  GA by U/S 30 w + 5 d  ANGEL by U/S: 2023  Assigned: based on ultrasound (GA), selected on 2023  Assigned GA 28 w + 4 d  Assigned ANGEL: 10/8/2023    Fetal Biometry  ============    Standard  BPD 75.4 mm 30w 2d 86% Hadlock  .0 mm 34w 4d >99% Nelli  .0 mm 32w 0d 97% Hadlock  .8 mm 30w 2d 87% Hadlock  Femur 57.4 mm 30w 1d 77% Hadlock  EFW 1,558 g 30w 0d 93% Hadlock  EFW (lb) 3 lb  EFW (oz) 7 oz  EFW by: Hadlock (BPD-HC-AC-FL)  Extended   9.1 mm  Other Structures   bpm    General Evaluation  ==============    Cardiac activity present.  bpm. Fetal movements: visualized. Presentation: cephalic  Placenta: posterior  Umbilical cord: Previously documented  Amniotic fluid: Amount of AF: normal amount. MVP 5.0 cm. JOSE 17.3 cm. Q1 5.0 cm, Q2 4.2 cm, Q3 4.4 cm, Q4 3.7 cm    Fetal Anatomy  ===========    Cranium: normal  Lateral ventricles: details  4-chamber view: normal  3-vessel view: normal  Stomach: normal  Kidneys: normal  Bladder: normal  Fetal sex: male    Findings  =======    Follow up ultrasound (51171)    This is a tidwell pregnancy with biometry consistent with prior dating. Cardiac rate and rhythm appear WNL. Anatomy appears normal as noted above including lateral  ventricles. Sent in

## 2023-08-08 ENCOUNTER — ROUTINE PRENATAL (OUTPATIENT)
Age: 23
End: 2023-08-08

## 2023-08-08 VITALS
BODY MASS INDEX: 41.73 KG/M2 | WEIGHT: 244.44 LBS | SYSTOLIC BLOOD PRESSURE: 126 MMHG | RESPIRATION RATE: 16 BRPM | DIASTOLIC BLOOD PRESSURE: 82 MMHG | HEART RATE: 108 BPM | OXYGEN SATURATION: 99 % | HEIGHT: 64 IN

## 2023-08-08 DIAGNOSIS — Z3A.31 31 WEEKS GESTATION OF PREGNANCY: Primary | ICD-10-CM

## 2023-08-08 DIAGNOSIS — Z34.93 NORMAL PREGNANCY IN THIRD TRIMESTER: ICD-10-CM

## 2023-08-08 PROCEDURE — 0502F SUBSEQUENT PRENATAL CARE: CPT | Performed by: OBSTETRICS & GYNECOLOGY

## 2023-08-22 ENCOUNTER — ROUTINE PRENATAL (OUTPATIENT)
Age: 23
End: 2023-08-22

## 2023-08-22 VITALS
BODY MASS INDEX: 42.36 KG/M2 | WEIGHT: 248.13 LBS | DIASTOLIC BLOOD PRESSURE: 95 MMHG | SYSTOLIC BLOOD PRESSURE: 140 MMHG | HEIGHT: 64 IN

## 2023-08-22 DIAGNOSIS — Z3A.33 33 WEEKS GESTATION OF PREGNANCY: ICD-10-CM

## 2023-08-22 DIAGNOSIS — O16.9 HYPERTENSION DURING PREGNANCY, ANTEPARTUM, UNSPECIFIED HYPERTENSION IN PREGNANCY TYPE: Primary | ICD-10-CM

## 2023-08-22 PROCEDURE — 0502F SUBSEQUENT PRENATAL CARE: CPT | Performed by: OBSTETRICS & GYNECOLOGY

## 2023-08-22 NOTE — PROGRESS NOTES
ELEVATED BP BUT AT THE BASELINE COMPARING TO ZAHEER GESTATION  FOLLOWED BY MFM, MISSED LAST LILI  WILL CALL M TO RESCHEDULE  CONT ASA 81 MG  PIH LABS  PREECLAMPSIA PRECAUTIONS  RTC WEEKLY

## 2023-08-23 ENCOUNTER — TELEPHONE (OUTPATIENT)
Age: 23
End: 2023-08-23

## 2023-08-23 DIAGNOSIS — O99.013 ANEMIA AFFECTING PREGNANCY IN THIRD TRIMESTER: Primary | ICD-10-CM

## 2023-08-23 LAB
ALBUMIN SERPL-MCNC: 3.6 G/DL (ref 4–5)
ALBUMIN/GLOB SERPL: 1.2 {RATIO} (ref 1.2–2.2)
ALP SERPL-CCNC: 107 IU/L (ref 44–121)
ALT SERPL-CCNC: 4 IU/L (ref 0–32)
AST SERPL-CCNC: 8 IU/L (ref 0–40)
BASOPHILS # BLD AUTO: 0 X10E3/UL (ref 0–0.2)
BASOPHILS NFR BLD AUTO: 0 %
BILIRUB SERPL-MCNC: <0.2 MG/DL (ref 0–1.2)
BUN SERPL-MCNC: 5 MG/DL (ref 6–20)
BUN/CREAT SERPL: 7 (ref 9–23)
CALCIUM SERPL-MCNC: 9.8 MG/DL (ref 8.7–10.2)
CHLORIDE SERPL-SCNC: 104 MMOL/L (ref 96–106)
CO2 SERPL-SCNC: 19 MMOL/L (ref 20–29)
CREAT SERPL-MCNC: 0.68 MG/DL (ref 0.57–1)
EGFRCR SERPLBLD CKD-EPI 2021: 126 ML/MIN/1.73
EOSINOPHIL # BLD AUTO: 0.2 X10E3/UL (ref 0–0.4)
EOSINOPHIL NFR BLD AUTO: 2 %
ERYTHROCYTE [DISTWIDTH] IN BLOOD BY AUTOMATED COUNT: 13 % (ref 11.7–15.4)
GLOBULIN SER CALC-MCNC: 2.9 G/DL (ref 1.5–4.5)
GLUCOSE SERPL-MCNC: 80 MG/DL (ref 70–99)
HCT VFR BLD AUTO: 29.3 % (ref 34–46.6)
HGB BLD-MCNC: 9.7 G/DL (ref 11.1–15.9)
IMM GRANULOCYTES # BLD AUTO: 0.1 X10E3/UL (ref 0–0.1)
IMM GRANULOCYTES NFR BLD AUTO: 1 %
LYMPHOCYTES # BLD AUTO: 2 X10E3/UL (ref 0.7–3.1)
LYMPHOCYTES NFR BLD AUTO: 16 %
MCH RBC QN AUTO: 25.7 PG (ref 26.6–33)
MCHC RBC AUTO-ENTMCNC: 33.1 G/DL (ref 31.5–35.7)
MCV RBC AUTO: 78 FL (ref 79–97)
MONOCYTES # BLD AUTO: 1.3 X10E3/UL (ref 0.1–0.9)
MONOCYTES NFR BLD AUTO: 10 %
NEUTROPHILS # BLD AUTO: 9 X10E3/UL (ref 1.4–7)
NEUTROPHILS NFR BLD AUTO: 71 %
PLATELET # BLD AUTO: 330 X10E3/UL (ref 150–450)
POTASSIUM SERPL-SCNC: 3.6 MMOL/L (ref 3.5–5.2)
PROT SERPL-MCNC: 6.5 G/DL (ref 6–8.5)
RBC # BLD AUTO: 3.77 X10E6/UL (ref 3.77–5.28)
SODIUM SERPL-SCNC: 136 MMOL/L (ref 134–144)
URATE SERPL-MCNC: 4.7 MG/DL (ref 2.6–6.2)
WBC # BLD AUTO: 12.6 X10E3/UL (ref 3.4–10.8)

## 2023-08-23 RX ORDER — FERROUS SULFATE 325(65) MG
325 TABLET ORAL 2 TIMES DAILY
Qty: 60 TABLET | Refills: 5 | Status: SHIPPED | OUTPATIENT
Start: 2023-08-23

## 2023-08-23 NOTE — TELEPHONE ENCOUNTER
Received a call from the patient regarding her lab results. Advised her she is anemic and a prescription will be submitted to her pharmacy for ferrous sulfate.

## 2023-08-29 ENCOUNTER — ROUTINE PRENATAL (OUTPATIENT)
Age: 23
End: 2023-08-29

## 2023-08-29 VITALS
HEIGHT: 64 IN | SYSTOLIC BLOOD PRESSURE: 118 MMHG | DIASTOLIC BLOOD PRESSURE: 76 MMHG | BODY MASS INDEX: 42.75 KG/M2 | WEIGHT: 250.38 LBS

## 2023-08-29 DIAGNOSIS — Z3A.34 34 WEEKS GESTATION OF PREGNANCY: Primary | ICD-10-CM

## 2023-08-29 DIAGNOSIS — Z34.93 NORMAL PREGNANCY IN THIRD TRIMESTER: ICD-10-CM

## 2023-08-29 PROCEDURE — 0502F SUBSEQUENT PRENATAL CARE: CPT | Performed by: OBSTETRICS & GYNECOLOGY

## 2023-09-05 ENCOUNTER — ROUTINE PRENATAL (OUTPATIENT)
Age: 23
End: 2023-09-05

## 2023-09-05 VITALS
SYSTOLIC BLOOD PRESSURE: 132 MMHG | WEIGHT: 250 LBS | DIASTOLIC BLOOD PRESSURE: 92 MMHG | HEIGHT: 64 IN | BODY MASS INDEX: 42.68 KG/M2

## 2023-09-05 DIAGNOSIS — Z34.93 NORMAL PREGNANCY IN THIRD TRIMESTER: ICD-10-CM

## 2023-09-05 DIAGNOSIS — Z3A.35 35 WEEKS GESTATION OF PREGNANCY: ICD-10-CM

## 2023-09-05 DIAGNOSIS — Z36.85 SCREENING, ANTENATAL, FOR STREPTOCOCCUS B: Primary | ICD-10-CM

## 2023-09-05 PROCEDURE — 0502F SUBSEQUENT PRENATAL CARE: CPT | Performed by: OBSTETRICS & GYNECOLOGY

## 2023-09-05 NOTE — PROGRESS NOTES
Ketones:NEG // Wolf Rounds //  Chelsy Hendrickson //  Pro:NEG //  Nit:NEG // Leuk: NEG   Pt presents with no new ob complaints//GBS TODAY//Carolyn

## 2023-09-07 LAB — GP B STREP DNA SPEC QL NAA+PROBE: NEGATIVE

## 2023-09-11 ENCOUNTER — ROUTINE PRENATAL (OUTPATIENT)
Age: 23
End: 2023-09-11
Payer: COMMERCIAL

## 2023-09-11 VITALS — SYSTOLIC BLOOD PRESSURE: 123 MMHG | DIASTOLIC BLOOD PRESSURE: 86 MMHG | HEART RATE: 86 BPM

## 2023-09-11 DIAGNOSIS — Z03.79 ENCOUNTER FOR OTHER SUSPECTED MATERNAL AND FETAL CONDITIONS RULED OUT: ICD-10-CM

## 2023-09-11 DIAGNOSIS — Z3A.36 36 WEEKS GESTATION OF PREGNANCY: Primary | ICD-10-CM

## 2023-09-11 DIAGNOSIS — E66.01 MORBID OBESITY WITH BMI OF 40.0-44.9, ADULT (HCC): ICD-10-CM

## 2023-09-11 PROCEDURE — 76816 OB US FOLLOW-UP PER FETUS: CPT | Performed by: OBSTETRICS & GYNECOLOGY

## 2023-09-11 PROCEDURE — 99212 OFFICE O/P EST SF 10 MIN: CPT | Performed by: OBSTETRICS & GYNECOLOGY

## 2023-09-11 NOTE — PROCEDURES
gestational age/maternal body habitus. Fetal heart rate and rhythm appear WNL today. Normal fluid and fetal movement are  noted. Biophysical Profile without NST (16879)    Please see biophysical profile score noted above. Fetal movement and fluid volume appear normal.    Plan of Care  ==========    Shivani reports that her cfDNA was low-risk    CMV IgM/IgG with avidity and toxo IgM/IgG were ordered but she lost her lab slip so didn't get them done; she declined a fetal MRI. Doc Pacheco stopped taking her adderall XR  15mg after the dx of fetal PACs. fetal ECHO WNL. Delivery is recommended at 39 weeks (or sooner if otherwise clinically indicated). Patient was counseled on the findings. Questions and concerns were addressed. A total of 15 minutes was spent on this visit reviewing previous notes, counseling the patient and documenting the findings in the note.     Follow-up  ========    BPP in 1 week

## 2023-09-12 ENCOUNTER — ROUTINE PRENATAL (OUTPATIENT)
Age: 23
End: 2023-09-12

## 2023-09-12 VITALS — BODY MASS INDEX: 43.46 KG/M2 | DIASTOLIC BLOOD PRESSURE: 94 MMHG | SYSTOLIC BLOOD PRESSURE: 149 MMHG | WEIGHT: 253.2 LBS

## 2023-09-12 DIAGNOSIS — Z34.93 NORMAL PREGNANCY IN THIRD TRIMESTER: ICD-10-CM

## 2023-09-12 DIAGNOSIS — Z3A.36 36 WEEKS GESTATION OF PREGNANCY: Primary | ICD-10-CM

## 2023-09-12 PROCEDURE — 0502F SUBSEQUENT PRENATAL CARE: CPT | Performed by: OBSTETRICS & GYNECOLOGY

## 2023-09-19 ENCOUNTER — ROUTINE PRENATAL (OUTPATIENT)
Age: 23
End: 2023-09-19

## 2023-09-19 ENCOUNTER — HOSPITAL ENCOUNTER (INPATIENT)
Facility: HOSPITAL | Age: 23
LOS: 3 days | Discharge: HOME | End: 2023-09-22
Attending: OBSTETRICS & GYNECOLOGY | Admitting: PEDIATRICS
Payer: COMMERCIAL

## 2023-09-19 VITALS
HEIGHT: 64 IN | DIASTOLIC BLOOD PRESSURE: 108 MMHG | SYSTOLIC BLOOD PRESSURE: 154 MMHG | WEIGHT: 255.5 LBS | BODY MASS INDEX: 43.62 KG/M2

## 2023-09-19 DIAGNOSIS — G89.18 POSTOPERATIVE PAIN: Primary | ICD-10-CM

## 2023-09-19 DIAGNOSIS — Z3A.37 37 WEEKS GESTATION OF PREGNANCY: Primary | ICD-10-CM

## 2023-09-19 LAB
ABO + RH BLD: NORMAL
ALT SERPL-CCNC: 8 U/L (ref 12–78)
APPEARANCE UR: CLEAR
AST SERPL W P-5'-P-CCNC: 9 U/L (ref 15–37)
BACTERIA URNS QL MICRO: NEGATIVE /HPF
BASOPHILS # BLD: 0 K/UL (ref 0–0.1)
BASOPHILS NFR BLD: 0 % (ref 0–1)
BILIRUB UR QL: NEGATIVE
BLOOD GROUP ANTIBODIES SERPL: NEGATIVE
COLOR UR: ABNORMAL
CREAT UR-MCNC: 33 MG/DL
DIFFERENTIAL METHOD BLD: ABNORMAL
EOSINOPHIL # BLD: 0.2 K/UL (ref 0–0.4)
EOSINOPHIL NFR BLD: 2 % (ref 0–7)
EPITH CASTS URNS QL MICRO: ABNORMAL /LPF
ERYTHROCYTE [DISTWIDTH] IN BLOOD BY AUTOMATED COUNT: 14.2 % (ref 11.5–14.5)
GLUCOSE UR STRIP.AUTO-MCNC: NEGATIVE MG/DL
HCT VFR BLD AUTO: 29.2 % (ref 35–47)
HGB BLD-MCNC: 9.5 G/DL (ref 11.5–16)
HGB UR QL STRIP: ABNORMAL
IMM GRANULOCYTES # BLD AUTO: 0.1 K/UL (ref 0–0.04)
IMM GRANULOCYTES NFR BLD AUTO: 1 % (ref 0–0.5)
KETONES UR QL STRIP.AUTO: NEGATIVE MG/DL
LEUKOCYTE ESTERASE UR QL STRIP.AUTO: ABNORMAL
LYMPHOCYTES # BLD: 1.6 K/UL (ref 0.8–3.5)
LYMPHOCYTES NFR BLD: 15 % (ref 12–49)
MCH RBC QN AUTO: 24.7 PG (ref 26–34)
MCHC RBC AUTO-ENTMCNC: 32.5 G/DL (ref 30–36.5)
MCV RBC AUTO: 76 FL (ref 80–99)
MONOCYTES # BLD: 0.9 K/UL (ref 0–1)
MONOCYTES NFR BLD: 9 % (ref 5–13)
NEUTS SEG # BLD: 7.7 K/UL (ref 1.8–8)
NEUTS SEG NFR BLD: 73 % (ref 32–75)
NITRITE UR QL STRIP.AUTO: NEGATIVE
NRBC # BLD: 0 K/UL (ref 0–0.01)
NRBC BLD-RTO: 0 PER 100 WBC
PH UR STRIP: 7 (ref 5–8)
PLATELET # BLD AUTO: 319 K/UL (ref 150–400)
PMV BLD AUTO: 9.2 FL (ref 8.9–12.9)
PROT UR STRIP-MCNC: NEGATIVE MG/DL
PROT UR-MCNC: 15 MG/DL (ref 0–11.9)
PROT/CREAT UR-RTO: 0.5
RBC # BLD AUTO: 3.84 M/UL (ref 3.8–5.2)
RBC #/AREA URNS HPF: ABNORMAL /HPF (ref 0–5)
SP GR UR REFRACTOMETRY: <1.005 (ref 1–1.03)
SPECIMEN EXP DATE BLD: NORMAL
URATE SERPL-MCNC: 4.5 MG/DL (ref 2.6–6)
UROBILINOGEN UR QL STRIP.AUTO: 0.1 EU/DL (ref 0.1–1)
WBC # BLD AUTO: 10.6 K/UL (ref 3.6–11)
WBC URNS QL MICRO: ABNORMAL /HPF (ref 0–4)

## 2023-09-19 PROCEDURE — 36415 COLL VENOUS BLD VENIPUNCTURE: CPT

## 2023-09-19 PROCEDURE — 86900 BLOOD TYPING SEROLOGIC ABO: CPT

## 2023-09-19 PROCEDURE — 84156 ASSAY OF PROTEIN URINE: CPT

## 2023-09-19 PROCEDURE — 6370000000 HC RX 637 (ALT 250 FOR IP): Performed by: OBSTETRICS & GYNECOLOGY

## 2023-09-19 PROCEDURE — 99285 EMERGENCY DEPT VISIT HI MDM: CPT

## 2023-09-19 PROCEDURE — 2580000003 HC RX 258: Performed by: OBSTETRICS & GYNECOLOGY

## 2023-09-19 PROCEDURE — 84450 TRANSFERASE (AST) (SGOT): CPT

## 2023-09-19 PROCEDURE — 84550 ASSAY OF BLOOD/URIC ACID: CPT

## 2023-09-19 PROCEDURE — 0502F SUBSEQUENT PRENATAL CARE: CPT | Performed by: OBSTETRICS & GYNECOLOGY

## 2023-09-19 PROCEDURE — 85025 COMPLETE CBC W/AUTO DIFF WBC: CPT

## 2023-09-19 PROCEDURE — 80307 DRUG TEST PRSMV CHEM ANLYZR: CPT

## 2023-09-19 PROCEDURE — 81001 URINALYSIS AUTO W/SCOPE: CPT

## 2023-09-19 PROCEDURE — 1120000000 HC RM PRIVATE OB

## 2023-09-19 PROCEDURE — 82570 ASSAY OF URINE CREATININE: CPT

## 2023-09-19 PROCEDURE — 84460 ALANINE AMINO (ALT) (SGPT): CPT

## 2023-09-19 PROCEDURE — 86901 BLOOD TYPING SEROLOGIC RH(D): CPT

## 2023-09-19 PROCEDURE — 86850 RBC ANTIBODY SCREEN: CPT

## 2023-09-19 RX ORDER — ONDANSETRON 2 MG/ML
4 INJECTION INTRAMUSCULAR; INTRAVENOUS EVERY 6 HOURS PRN
Status: DISCONTINUED | OUTPATIENT
Start: 2023-09-19 | End: 2023-09-20

## 2023-09-19 RX ORDER — SODIUM CHLORIDE, SODIUM LACTATE, POTASSIUM CHLORIDE, AND CALCIUM CHLORIDE .6; .31; .03; .02 G/100ML; G/100ML; G/100ML; G/100ML
500 INJECTION, SOLUTION INTRAVENOUS PRN
Status: DISCONTINUED | OUTPATIENT
Start: 2023-09-19 | End: 2023-09-20

## 2023-09-19 RX ORDER — METHYLERGONOVINE MALEATE 0.2 MG/ML
200 INJECTION INTRAVENOUS PRN
Status: DISCONTINUED | OUTPATIENT
Start: 2023-09-19 | End: 2023-09-20

## 2023-09-19 RX ORDER — SODIUM CHLORIDE, SODIUM LACTATE, POTASSIUM CHLORIDE, CALCIUM CHLORIDE 600; 310; 30; 20 MG/100ML; MG/100ML; MG/100ML; MG/100ML
INJECTION, SOLUTION INTRAVENOUS CONTINUOUS
Status: DISCONTINUED | OUTPATIENT
Start: 2023-09-19 | End: 2023-09-20

## 2023-09-19 RX ORDER — ZOLPIDEM TARTRATE 5 MG/1
5 TABLET ORAL NIGHTLY PRN
Status: DISCONTINUED | OUTPATIENT
Start: 2023-09-19 | End: 2023-09-22 | Stop reason: HOSPADM

## 2023-09-19 RX ORDER — TERBUTALINE SULFATE 1 MG/ML
0.25 INJECTION, SOLUTION SUBCUTANEOUS
Status: DISCONTINUED | OUTPATIENT
Start: 2023-09-19 | End: 2023-09-20

## 2023-09-19 RX ORDER — SODIUM CHLORIDE, SODIUM LACTATE, POTASSIUM CHLORIDE, AND CALCIUM CHLORIDE .6; .31; .03; .02 G/100ML; G/100ML; G/100ML; G/100ML
1000 INJECTION, SOLUTION INTRAVENOUS PRN
Status: DISCONTINUED | OUTPATIENT
Start: 2023-09-19 | End: 2023-09-20

## 2023-09-19 RX ORDER — MISOPROSTOL 200 UG/1
800 TABLET ORAL PRN
Status: DISCONTINUED | OUTPATIENT
Start: 2023-09-19 | End: 2023-09-20

## 2023-09-19 RX ORDER — CARBOPROST TROMETHAMINE 250 UG/ML
250 INJECTION, SOLUTION INTRAMUSCULAR PRN
Status: DISCONTINUED | OUTPATIENT
Start: 2023-09-19 | End: 2023-09-20

## 2023-09-19 RX ORDER — SODIUM CHLORIDE 0.9 % (FLUSH) 0.9 %
5-40 SYRINGE (ML) INJECTION EVERY 8 HOURS PRN
Status: DISCONTINUED | OUTPATIENT
Start: 2023-09-19 | End: 2023-09-20

## 2023-09-19 RX ADMIN — SODIUM CHLORIDE, PRESERVATIVE FREE 10 ML: 5 INJECTION INTRAVENOUS at 16:00

## 2023-09-19 RX ADMIN — ZOLPIDEM TARTRATE 5 MG: 5 TABLET ORAL at 23:49

## 2023-09-19 ASSESSMENT — PAIN SCALES - GENERAL
PAINLEVEL_OUTOF10: 0

## 2023-09-19 NOTE — PLAN OF CARE
Problem: Vaginal Birth or  Section  Goal: Fetal and maternal status remain reassuring during the birth process  Description:  Birth OB-Pregnancy care plan goal which identifies if the fetal and maternal status remain reassuring during the birth process  Outcome: Progressing     Problem: Safety - Adult  Goal: Free from fall injury  Outcome: Progressing     Problem: Discharge Planning  Goal: Discharge to home or other facility with appropriate resources  Outcome: Progressing     Problem: Chronic Conditions and Co-morbidities  Goal: Patient's chronic conditions and co-morbidity symptoms are monitored and maintained or improved  Outcome: Progressing

## 2023-09-19 NOTE — PROGRESS NOTES
1455: Received this 24y/o F ambulatory to Labor/Delivery #3 after sent from Dr. Vicky Doan office d/t elevated blood pressures x2 to be monitored and evaluated. She is a  with an EDC: 10/8/20. IUP @ 37.2 weeks. Gown on and urine specimen obtained for an urine specimen to be obtained for an U/A. Patient positioned herself in the bed in a semi-nuno's position. 1510: Shift assessment initiated and completed. Temp 98.6 and denied pain. Patient oriented to the room and POC reviewed. 1600: #20 Genie Due was placed in her left hand and blood specimen obtained as ordered and sent to the lab. Patient tolerated the procedure well.      1620: The writer informed Dr. Artie Rosales of the admission and her reason for her admission. Orders received has followed: Type and Screen and regular diet. 1709: Patient sitting up eating her supper. 1800: The writer informed Dr. Artie Rosales of the patient's lab results and blood pressures at this time. No new orders received at this time. 1829: Dr. Artie Rosales reviewed all of the patient's blood pressures thus far. No new orders received at this time. 1839: Dr. Artie Rosales in on rounds and discussing the POC. Verbal order received to admit the patient to Labor/Delivery. 1910: Bedside report was given to JEFFERY Aguiar RN.

## 2023-09-20 ENCOUNTER — ANESTHESIA (OUTPATIENT)
Facility: HOSPITAL | Age: 23
End: 2023-09-20
Payer: COMMERCIAL

## 2023-09-20 ENCOUNTER — ANESTHESIA EVENT (OUTPATIENT)
Facility: HOSPITAL | Age: 23
End: 2023-09-20
Payer: COMMERCIAL

## 2023-09-20 LAB
ALBUMIN SERPL-MCNC: 2.3 G/DL (ref 3.5–5)
ALBUMIN/GLOB SERPL: 0.5 (ref 1.1–2.2)
ALP SERPL-CCNC: 109 U/L (ref 45–117)
ALT SERPL-CCNC: 9 U/L (ref 12–78)
AMPHET UR QL SCN: NEGATIVE
ANION GAP SERPL CALC-SCNC: 7 MMOL/L (ref 5–15)
AST SERPL W P-5'-P-CCNC: 10 U/L (ref 15–37)
BARBITURATES UR QL SCN: NEGATIVE
BENZODIAZ UR QL: NEGATIVE
BILIRUB SERPL-MCNC: 0.2 MG/DL (ref 0.2–1)
BUN SERPL-MCNC: 5 MG/DL (ref 6–20)
BUN/CREAT SERPL: 8 (ref 12–20)
CA-I BLD-MCNC: 8.7 MG/DL (ref 8.5–10.1)
CANNABINOIDS UR QL SCN: NEGATIVE
CHLORIDE SERPL-SCNC: 111 MMOL/L (ref 97–108)
CO2 SERPL-SCNC: 21 MMOL/L (ref 21–32)
COCAINE UR QL SCN: NEGATIVE
CREAT SERPL-MCNC: 0.6 MG/DL (ref 0.55–1.02)
GLOBULIN SER CALC-MCNC: 4.3 G/DL (ref 2–4)
GLUCOSE BLD STRIP.AUTO-MCNC: 102 MG/DL (ref 65–100)
GLUCOSE SERPL-MCNC: 87 MG/DL (ref 65–100)
Lab: NORMAL
METHADONE UR QL: NEGATIVE
OPIATES UR QL: NEGATIVE
PCP UR QL: NEGATIVE
PERFORMED BY:: ABNORMAL
POTASSIUM SERPL-SCNC: 3.4 MMOL/L (ref 3.5–5.1)
PROT SERPL-MCNC: 6.6 G/DL (ref 6.4–8.2)
SODIUM SERPL-SCNC: 139 MMOL/L (ref 136–145)

## 2023-09-20 PROCEDURE — 6360000002 HC RX W HCPCS: Performed by: NURSE ANESTHETIST, CERTIFIED REGISTERED

## 2023-09-20 PROCEDURE — 2580000003 HC RX 258: Performed by: NURSE ANESTHETIST, CERTIFIED REGISTERED

## 2023-09-20 PROCEDURE — 82962 GLUCOSE BLOOD TEST: CPT

## 2023-09-20 PROCEDURE — 6370000000 HC RX 637 (ALT 250 FOR IP): Performed by: OBSTETRICS & GYNECOLOGY

## 2023-09-20 PROCEDURE — 2709999900 HC NON-CHARGEABLE SUPPLY: Performed by: OBSTETRICS & GYNECOLOGY

## 2023-09-20 PROCEDURE — 7210000100 HC LABOR FEE PER 1 HR

## 2023-09-20 PROCEDURE — 7100000001 HC PACU RECOVERY - ADDTL 15 MIN: Performed by: OBSTETRICS & GYNECOLOGY

## 2023-09-20 PROCEDURE — 2580000003 HC RX 258: Performed by: OBSTETRICS & GYNECOLOGY

## 2023-09-20 PROCEDURE — 6360000002 HC RX W HCPCS: Performed by: OBSTETRICS & GYNECOLOGY

## 2023-09-20 PROCEDURE — 3700000000 HC ANESTHESIA ATTENDED CARE: Performed by: OBSTETRICS & GYNECOLOGY

## 2023-09-20 PROCEDURE — 1120000000 HC RM PRIVATE OB

## 2023-09-20 PROCEDURE — 36415 COLL VENOUS BLD VENIPUNCTURE: CPT

## 2023-09-20 PROCEDURE — 59510 CESAREAN DELIVERY: CPT | Performed by: OBSTETRICS & GYNECOLOGY

## 2023-09-20 PROCEDURE — 7100000000 HC PACU RECOVERY - FIRST 15 MIN: Performed by: OBSTETRICS & GYNECOLOGY

## 2023-09-20 PROCEDURE — 3609079900 HC CESAREAN SECTION: Performed by: OBSTETRICS & GYNECOLOGY

## 2023-09-20 PROCEDURE — 3700000001 HC ADD 15 MINUTES (ANESTHESIA): Performed by: OBSTETRICS & GYNECOLOGY

## 2023-09-20 PROCEDURE — 6370000000 HC RX 637 (ALT 250 FOR IP): Performed by: NURSE ANESTHETIST, CERTIFIED REGISTERED

## 2023-09-20 PROCEDURE — 80053 COMPREHEN METABOLIC PANEL: CPT

## 2023-09-20 PROCEDURE — 2720000010 HC SURG SUPPLY STERILE: Performed by: OBSTETRICS & GYNECOLOGY

## 2023-09-20 PROCEDURE — 6360000002 HC RX W HCPCS

## 2023-09-20 PROCEDURE — 2500000003 HC RX 250 WO HCPCS: Performed by: NURSE ANESTHETIST, CERTIFIED REGISTERED

## 2023-09-20 RX ORDER — ONDANSETRON 2 MG/ML
INJECTION INTRAMUSCULAR; INTRAVENOUS PRN
Status: DISCONTINUED | OUTPATIENT
Start: 2023-09-20 | End: 2023-09-20 | Stop reason: SDUPTHER

## 2023-09-20 RX ORDER — IBUPROFEN 600 MG/1
600 TABLET ORAL EVERY 8 HOURS SCHEDULED
Status: DISCONTINUED | OUTPATIENT
Start: 2023-09-20 | End: 2023-09-22 | Stop reason: HOSPADM

## 2023-09-20 RX ORDER — DEXAMETHASONE SODIUM PHOSPHATE 4 MG/ML
INJECTION, SOLUTION INTRA-ARTICULAR; INTRALESIONAL; INTRAMUSCULAR; INTRAVENOUS; SOFT TISSUE PRN
Status: DISCONTINUED | OUTPATIENT
Start: 2023-09-20 | End: 2023-09-20 | Stop reason: SDUPTHER

## 2023-09-20 RX ORDER — OXYTOCIN/RINGER'S LACTATE 30/500 ML
PLASTIC BAG, INJECTION (ML) INTRAVENOUS CONTINUOUS PRN
Status: DISCONTINUED | OUTPATIENT
Start: 2023-09-20 | End: 2023-09-20 | Stop reason: SDUPTHER

## 2023-09-20 RX ORDER — DOCUSATE SODIUM 100 MG/1
100 CAPSULE, LIQUID FILLED ORAL 2 TIMES DAILY
Status: DISCONTINUED | OUTPATIENT
Start: 2023-09-20 | End: 2023-09-22 | Stop reason: HOSPADM

## 2023-09-20 RX ORDER — CITRIC ACID/SODIUM CITRATE 334-500MG
SOLUTION, ORAL ORAL PRN
Status: DISCONTINUED | OUTPATIENT
Start: 2023-09-20 | End: 2023-09-20 | Stop reason: SDUPTHER

## 2023-09-20 RX ORDER — BUPIVACAINE HYDROCHLORIDE 7.5 MG/ML
INJECTION, SOLUTION EPIDURAL; RETROBULBAR PRN
Status: DISCONTINUED | OUTPATIENT
Start: 2023-09-20 | End: 2023-09-20 | Stop reason: SDUPTHER

## 2023-09-20 RX ORDER — OXYCODONE HYDROCHLORIDE 5 MG/1
5 TABLET ORAL EVERY 4 HOURS PRN
Status: DISCONTINUED | OUTPATIENT
Start: 2023-09-20 | End: 2023-09-22 | Stop reason: HOSPADM

## 2023-09-20 RX ORDER — SODIUM CHLORIDE, SODIUM LACTATE, POTASSIUM CHLORIDE, AND CALCIUM CHLORIDE .6; .31; .03; .02 G/100ML; G/100ML; G/100ML; G/100ML
1000 INJECTION, SOLUTION INTRAVENOUS ONCE
Status: COMPLETED | OUTPATIENT
Start: 2023-09-20 | End: 2023-09-20

## 2023-09-20 RX ORDER — KETOROLAC TROMETHAMINE 30 MG/ML
INJECTION, SOLUTION INTRAMUSCULAR; INTRAVENOUS PRN
Status: DISCONTINUED | OUTPATIENT
Start: 2023-09-20 | End: 2023-09-20 | Stop reason: SDUPTHER

## 2023-09-20 RX ORDER — SODIUM CHLORIDE, SODIUM LACTATE, POTASSIUM CHLORIDE, CALCIUM CHLORIDE 600; 310; 30; 20 MG/100ML; MG/100ML; MG/100ML; MG/100ML
INJECTION, SOLUTION INTRAVENOUS CONTINUOUS
Status: DISCONTINUED | OUTPATIENT
Start: 2023-09-20 | End: 2023-09-21

## 2023-09-20 RX ORDER — SODIUM CHLORIDE 9 MG/ML
INJECTION, SOLUTION INTRAVENOUS PRN
Status: DISCONTINUED | OUTPATIENT
Start: 2023-09-20 | End: 2023-09-22 | Stop reason: HOSPADM

## 2023-09-20 RX ORDER — MORPHINE SULFATE 0.5 MG/ML
INJECTION, SOLUTION EPIDURAL; INTRATHECAL; INTRAVENOUS PRN
Status: DISCONTINUED | OUTPATIENT
Start: 2023-09-20 | End: 2023-09-20 | Stop reason: SDUPTHER

## 2023-09-20 RX ORDER — ONDANSETRON 2 MG/ML
4 INJECTION INTRAMUSCULAR; INTRAVENOUS EVERY 6 HOURS PRN
Status: DISCONTINUED | OUTPATIENT
Start: 2023-09-20 | End: 2023-09-22 | Stop reason: HOSPADM

## 2023-09-20 RX ORDER — SODIUM CHLORIDE 0.9 % (FLUSH) 0.9 %
5-40 SYRINGE (ML) INJECTION PRN
Status: DISCONTINUED | OUTPATIENT
Start: 2023-09-20 | End: 2023-09-22 | Stop reason: HOSPADM

## 2023-09-20 RX ORDER — OXYCODONE HYDROCHLORIDE 10 MG/1
10 TABLET ORAL EVERY 4 HOURS PRN
Status: DISCONTINUED | OUTPATIENT
Start: 2023-09-20 | End: 2023-09-22 | Stop reason: HOSPADM

## 2023-09-20 RX ORDER — FAMOTIDINE 10 MG/ML
INJECTION, SOLUTION INTRAVENOUS PRN
Status: DISCONTINUED | OUTPATIENT
Start: 2023-09-20 | End: 2023-09-20 | Stop reason: SDUPTHER

## 2023-09-20 RX ORDER — IBUPROFEN 600 MG/1
600 TABLET ORAL 4 TIMES DAILY PRN
Qty: 40 TABLET | Refills: 0 | Status: SHIPPED | OUTPATIENT
Start: 2023-09-20

## 2023-09-20 RX ORDER — ONDANSETRON 2 MG/ML
INJECTION INTRAMUSCULAR; INTRAVENOUS
Status: COMPLETED
Start: 2023-09-20 | End: 2023-09-20

## 2023-09-20 RX ORDER — OXYCODONE HYDROCHLORIDE AND ACETAMINOPHEN 5; 325 MG/1; MG/1
1 TABLET ORAL EVERY 6 HOURS PRN
Qty: 20 TABLET | Refills: 0 | Status: SHIPPED | OUTPATIENT
Start: 2023-09-20 | End: 2023-09-27

## 2023-09-20 RX ORDER — MODIFIED LANOLIN
OINTMENT (GRAM) TOPICAL
Status: DISCONTINUED | OUTPATIENT
Start: 2023-09-20 | End: 2023-09-22 | Stop reason: HOSPADM

## 2023-09-20 RX ORDER — SODIUM CHLORIDE 0.9 % (FLUSH) 0.9 %
5-40 SYRINGE (ML) INJECTION EVERY 12 HOURS SCHEDULED
Status: DISCONTINUED | OUTPATIENT
Start: 2023-09-20 | End: 2023-09-21

## 2023-09-20 RX ADMIN — ONDANSETRON 4 MG: 2 INJECTION INTRAMUSCULAR; INTRAVENOUS at 09:56

## 2023-09-20 RX ADMIN — Medication 87.3 MILLI-UNITS/MIN: at 11:18

## 2023-09-20 RX ADMIN — DEXAMETHASONE SODIUM PHOSPHATE 4 MG: 4 INJECTION, SOLUTION INTRA-ARTICULAR; INTRALESIONAL; INTRAMUSCULAR; INTRAVENOUS; SOFT TISSUE at 09:56

## 2023-09-20 RX ADMIN — SODIUM CITRATE AND CITRIC ACID MONOHYDRATE 30 ML: 500; 334 SOLUTION ORAL at 09:56

## 2023-09-20 RX ADMIN — ONDANSETRON 4 MG: 2 INJECTION INTRAMUSCULAR; INTRAVENOUS at 16:34

## 2023-09-20 RX ADMIN — FAMOTIDINE 20 MG: 10 INJECTION INTRAVENOUS at 09:56

## 2023-09-20 RX ADMIN — SODIUM CHLORIDE, POTASSIUM CHLORIDE, SODIUM LACTATE AND CALCIUM CHLORIDE: 600; 310; 30; 20 INJECTION, SOLUTION INTRAVENOUS at 00:16

## 2023-09-20 RX ADMIN — OXYCODONE HYDROCHLORIDE 5 MG: 5 TABLET ORAL at 18:23

## 2023-09-20 RX ADMIN — SODIUM CHLORIDE, POTASSIUM CHLORIDE, SODIUM LACTATE AND CALCIUM CHLORIDE: 600; 310; 30; 20 INJECTION, SOLUTION INTRAVENOUS at 10:39

## 2023-09-20 RX ADMIN — IBUPROFEN 600 MG: 600 TABLET ORAL at 22:11

## 2023-09-20 RX ADMIN — MORPHINE SULFATE 0.2 MG: 0.5 INJECTION, SOLUTION EPIDURAL; INTRATHECAL; INTRAVENOUS at 10:16

## 2023-09-20 RX ADMIN — CEFAZOLIN SODIUM 2000 MG: 1 INJECTION, POWDER, FOR SOLUTION INTRAMUSCULAR; INTRAVENOUS at 10:30

## 2023-09-20 RX ADMIN — Medication 500 MILLI-UNITS/MIN: at 10:40

## 2023-09-20 RX ADMIN — DOCUSATE SODIUM 100 MG: 100 CAPSULE, LIQUID FILLED ORAL at 22:11

## 2023-09-20 RX ADMIN — SODIUM CHLORIDE, POTASSIUM CHLORIDE, SODIUM LACTATE AND CALCIUM CHLORIDE 1000 ML: 600; 310; 30; 20 INJECTION, SOLUTION INTRAVENOUS at 08:44

## 2023-09-20 RX ADMIN — KETOROLAC TROMETHAMINE 30 MG: 30 INJECTION, SOLUTION INTRAMUSCULAR at 11:20

## 2023-09-20 RX ADMIN — PHENYLEPHRINE HYDROCHLORIDE 100 MCG/MIN: 10 INJECTION INTRAVENOUS at 10:17

## 2023-09-20 RX ADMIN — MISOPROSTOL 800 MCG: 200 TABLET ORAL at 12:15

## 2023-09-20 RX ADMIN — OXYCODONE HYDROCHLORIDE 10 MG: 5 TABLET ORAL at 13:09

## 2023-09-20 RX ADMIN — SODIUM CHLORIDE, POTASSIUM CHLORIDE, SODIUM LACTATE AND CALCIUM CHLORIDE: 600; 310; 30; 20 INJECTION, SOLUTION INTRAVENOUS at 11:18

## 2023-09-20 RX ADMIN — BUPIVACAINE HYDROCHLORIDE 12 MG: 7.5 INJECTION, SOLUTION EPIDURAL; RETROBULBAR at 10:16

## 2023-09-20 ASSESSMENT — PAIN DESCRIPTION - LOCATION
LOCATION: INCISION
LOCATION: INCISION

## 2023-09-20 ASSESSMENT — PAIN - FUNCTIONAL ASSESSMENT: PAIN_FUNCTIONAL_ASSESSMENT: ACTIVITIES ARE NOT PREVENTED

## 2023-09-20 ASSESSMENT — PAIN SCALES - GENERAL
PAINLEVEL_OUTOF10: 7
PAINLEVEL_OUTOF10: 5

## 2023-09-20 ASSESSMENT — PAIN DESCRIPTION - ORIENTATION
ORIENTATION: LOWER
ORIENTATION: LOWER

## 2023-09-20 ASSESSMENT — PAIN DESCRIPTION - DESCRIPTORS
DESCRIPTORS: SORE
DESCRIPTORS: ACHING;SORE

## 2023-09-20 NOTE — PROGRESS NOTES
07-Bedside shift report rcvd from F. Merrell Galeazzi, RN.   3026-DB at bedside, pt has decided on proceeding with an  for mild preeclampsia. Pt informed of risks. Over 20 minutes spent with pt explaining the risks of  and delivery at 37 weeks and 3 day. Pt believes that the baby is measuring large and will likely not be able to deliver vaginally and prefers to proceed with a . Educated on risks to baby with early delivery as well. 0750-Anesthesia and Nursery notified. 0935-Anesthesia at bedside. 1200-MD notified of moderate bleeding with clots. 1205-MD at bedside, clots were expressed manually at this time and order for rectal Cytotec rcvd. 1620-Pt called out stating that she woke sweating, when at bedside, pt was breastfeeding and states that she felt nauseas and was sweating. Vitals obtained and were noted to be WDL, POC glucose 102, provided pt with ice packs and removed gown and covers. Told pt this may be related to the rectal cytotec and hormone adjustments with PP and breastfeeding. MD rounded at this time. -Bedside shift report given to accepting Jolly Lemon RN.

## 2023-09-20 NOTE — LACTATION NOTE
This note was copied from a baby's chart. This first itme mother has baby at the breast within the first hour of life. Baby has a good latch and is sucking rhythmically. I encouraged her to  feed frequently for as long as baby wants and to wake him if needed every three hours. We talked about deep latch and the number of wet/stooled diapers ezpected each day. Mother has flatish nipples and I shoed her how to stimulate them and get baby a good latch. I gave her a bf book with lactation line and latch clinic information.

## 2023-09-20 NOTE — OP NOTE
Department of Obstetrics and Gynecology   Section Note    Indications: mild preeclampsia @ 37 2/7 weeks            Elective c section at term, fetal macrosomia    Pre-operative Diagnosis: 37 week 2 day pregnancy. Post-operative Diagnosis: Living  infant(s)    Surgeon: Remberto Barth MD     Assistants: Rio Porter MD, [facility]    Anesthesia: Spinal anesthesia    Procedure Details   The patient was seen in the Holding Room. The risks, benefits, complications, treatment options, and expected outcomes were discussed with the patient. The patient concurred with the proposed plan, giving informed consent. The site of surgery properly noted/marked. The patient was taken to Operating Room # 2, identified as Misael Singleton and the procedure verified as  Delivery. A Time Out was held and the above information confirmed. After induction of anesthesia, the patient was draped and prepped in the usual sterile manner. A Pfannenstiel incision was made and carried down through the subcutaneous tissue to the fascia. Fascial incision was made and extended transversely. The fascia was  from the underlying rectus tissue superiorly and inferiorly. The peritoneum was identified and entered. Peritoneal incision was extended longitudinally. The utero-vesical peritoneal reflection was incised transversely and the bladder flap was bluntly freed from the lower uterine segment. A low transverse uterine incision was made. Delivered from Vertex presentation was with Apgar scores of 9 at one minute and 9 at five minutes. After the umbilical cord was clamped and cut cord blood was obtained for evaluation. The placenta was removed intact and appeared normal. The uterine outline, tubes and ovaries appeared normal. The uterine incision was closed with running locked sutures of 0 Vicryl. Hemostasis was observed. Lavage was carried out until clear. The fascia was then reapproximated with running sutures of 0 Vicryl. The skin was reapproximated with staples. Instrument, sponge, and needle counts were correct prior the abdominal closure and at the conclusion of the case. Findings:  Normal gross fetal and maternal anatomy    Intake/Output:     Date 09/19/23 0701 - 09/20/23 0700 09/20/23 0701 - 09/21/23 0700   Shift 1754-4267 1563-9465 24 Hour Total 7874-8197 7841-9380 24 Hour Total   INTAKE   I.V.    1300  1300   Shift Total    1300  1300   OUTPUT   Urine 400  400      Shift Total 400  400      NET -400  -400 1300  1300            Drains: none                Specimens: none           Implants: none           Complications:  none         Disposition: PACU - hemodynamically stable. Condition: infant stable to general nursery    Attending Attestation: I performed the procedure.

## 2023-09-20 NOTE — ANESTHESIA PROCEDURE NOTES
CSE Block    Patient location during procedure: OB  Start time: 9/20/2023 10:15 AM  End time: 9/20/2023 10:16 AM  Reason for block: primary anesthetic  Staffing  Resident/CRNA: MARTITA Valencia - CRNA  Performed by: MARTITA Valencia CRNA  Authorized by: MD ALISON Lyle  Patient position: sitting  Prep: ChloraPrep  Patient monitoring: cardiac monitor, continuous pulse ox, continuous capnometry and frequent blood pressure checks  Approach: midline  Guidance: paresthesia technique  Provider prep: mask and sterile gloves  Spinal Needle  Needle type: pencil-tip   Needle gauge: 25 G  Needle length: 3.5 in

## 2023-09-21 LAB
BASOPHILS # BLD: 0 K/UL (ref 0–0.1)
BASOPHILS NFR BLD: 0 % (ref 0–1)
DIFFERENTIAL METHOD BLD: ABNORMAL
EOSINOPHIL # BLD: 0.1 K/UL (ref 0–0.4)
EOSINOPHIL NFR BLD: 1 % (ref 0–7)
ERYTHROCYTE [DISTWIDTH] IN BLOOD BY AUTOMATED COUNT: 14.2 % (ref 11.5–14.5)
HCT VFR BLD AUTO: 24.8 % (ref 35–47)
HGB BLD-MCNC: 7.9 G/DL (ref 11.5–16)
IMM GRANULOCYTES # BLD AUTO: 0.1 K/UL (ref 0–0.04)
IMM GRANULOCYTES NFR BLD AUTO: 1 % (ref 0–0.5)
LYMPHOCYTES # BLD: 2.7 K/UL (ref 0.8–3.5)
LYMPHOCYTES NFR BLD: 20 % (ref 12–49)
MCH RBC QN AUTO: 24.6 PG (ref 26–34)
MCHC RBC AUTO-ENTMCNC: 31.9 G/DL (ref 30–36.5)
MCV RBC AUTO: 77.3 FL (ref 80–99)
MONOCYTES # BLD: 1.2 K/UL (ref 0–1)
MONOCYTES NFR BLD: 8 % (ref 5–13)
NEUTS SEG # BLD: 9.5 K/UL (ref 1.8–8)
NEUTS SEG NFR BLD: 70 % (ref 32–75)
NRBC # BLD: 0 K/UL (ref 0–0.01)
NRBC BLD-RTO: 0 PER 100 WBC
PLATELET # BLD AUTO: 272 K/UL (ref 150–400)
PMV BLD AUTO: 9.5 FL (ref 8.9–12.9)
RBC # BLD AUTO: 3.21 M/UL (ref 3.8–5.2)
WBC # BLD AUTO: 13.6 K/UL (ref 3.6–11)

## 2023-09-21 PROCEDURE — 85025 COMPLETE CBC W/AUTO DIFF WBC: CPT

## 2023-09-21 PROCEDURE — 1120000000 HC RM PRIVATE OB

## 2023-09-21 PROCEDURE — 6370000000 HC RX 637 (ALT 250 FOR IP): Performed by: OBSTETRICS & GYNECOLOGY

## 2023-09-21 PROCEDURE — 36415 COLL VENOUS BLD VENIPUNCTURE: CPT

## 2023-09-21 RX ADMIN — OXYCODONE HYDROCHLORIDE 10 MG: 5 TABLET ORAL at 23:59

## 2023-09-21 RX ADMIN — IBUPROFEN 600 MG: 600 TABLET ORAL at 07:50

## 2023-09-21 RX ADMIN — OXYCODONE HYDROCHLORIDE 10 MG: 5 TABLET ORAL at 07:50

## 2023-09-21 RX ADMIN — DOCUSATE SODIUM 100 MG: 100 CAPSULE, LIQUID FILLED ORAL at 07:50

## 2023-09-21 RX ADMIN — OXYCODONE HYDROCHLORIDE 10 MG: 5 TABLET ORAL at 18:39

## 2023-09-21 RX ADMIN — DOCUSATE SODIUM 100 MG: 100 CAPSULE, LIQUID FILLED ORAL at 21:39

## 2023-09-21 RX ADMIN — OXYCODONE HYDROCHLORIDE 5 MG: 5 TABLET ORAL at 14:28

## 2023-09-21 RX ADMIN — IBUPROFEN 600 MG: 600 TABLET ORAL at 21:39

## 2023-09-21 RX ADMIN — IBUPROFEN 600 MG: 600 TABLET ORAL at 14:28

## 2023-09-21 RX ADMIN — OXYCODONE HYDROCHLORIDE 5 MG: 5 TABLET ORAL at 00:00

## 2023-09-21 ASSESSMENT — PAIN DESCRIPTION - DESCRIPTORS
DESCRIPTORS: ACHING
DESCRIPTORS: THROBBING
DESCRIPTORS: CRAMPING

## 2023-09-21 ASSESSMENT — PAIN DESCRIPTION - LOCATION
LOCATION: INCISION
LOCATION: ABDOMEN;INCISION
LOCATION: ABDOMEN

## 2023-09-21 ASSESSMENT — PAIN DESCRIPTION - ORIENTATION
ORIENTATION: LOWER

## 2023-09-21 ASSESSMENT — PAIN SCALES - GENERAL
PAINLEVEL_OUTOF10: 8
PAINLEVEL_OUTOF10: 6
PAINLEVEL_OUTOF10: 8

## 2023-09-21 ASSESSMENT — PAIN - FUNCTIONAL ASSESSMENT
PAIN_FUNCTIONAL_ASSESSMENT: ACTIVITIES ARE NOT PREVENTED

## 2023-09-21 NOTE — PROGRESS NOTES
TRANSFER - IN REPORT:    Verbal report received from Shiloh Cesar RN on Ricardo Cai  being received from Labor and Delivery for transfer of care to the postpartum unit. Report consisted of patient's Situation, Background, Assessment and   Recommendations(SBAR). Opportunity for questions and clarification was provided. Assessment and vitals completed upon patient's arrival to unit and care assumed.

## 2023-09-21 NOTE — PROGRESS NOTES
0730-Bedside shift report rcvd from milton Florence RN.   0835-Transferred to PP unit room 307, bedside shift report given to SERA Caal. Opportunity provided for questions or concerns at this time.

## 2023-09-21 NOTE — PLAN OF CARE
Problem: Pain  Goal: Verbalizes/displays adequate comfort level or baseline comfort level  Outcome: Progressing  Flowsheets (Taken 2023 2769)  Verbalizes/displays adequate comfort level or baseline comfort level:   Encourage patient to monitor pain and request assistance   Assess pain using appropriate pain scale   Administer analgesics based on type and severity of pain and evaluate response   Implement non-pharmacological measures as appropriate and evaluate response     Problem: Infection - Adult  Goal: Absence of infection during hospitalization  Outcome: Progressing     Problem: Safety - Adult  Goal: Free from fall injury  Outcome: Progressing     Problem: Postpartum  Goal: Experiences normal postpartum course  Description:  Postpartum OB-Pregnancy care plan goal which identifies if the mother is experiencing a normal postpartum course  Outcome: Progressing     Problem: Postpartum  Goal: Appropriate maternal -  bonding  Description:  Postpartum OB-Pregnancy care plan goal which identifies if the mother and  are bonding appropriately  Outcome: Progressing     Problem: Postpartum  Goal: Establishment of infant feeding pattern  Description:  Postpartum OB-Pregnancy care plan goal which identifies if the mother is establishing a feeding pattern with their   Outcome: Progressing     Problem: Postpartum  Goal: Incisions, wounds, or drain sites healing without S/S of infection  Outcome: Progressing

## 2023-09-21 NOTE — ADT AUTH CERT
[address] [place] [address] INFANT  [address] [address]  @NPI [id number]   @TID [id number]   Auth number: [id number]      RETURN CONTACT:  Harvie Primrose   Ph. [phone]  Fax: [phone]   Last edited by Harvie Primrose on 23 at [address] Debra #[id number] ([id number]) (:2000 21 y.o. F) (Adm: 23)  [id number]  PCP    Sandra JOYNER  Demographics  Comment        Address   [address] [place], [place] [id number] [place] [address] [address] [facility]    Home Phone   [phone]    Work Phone       Mobile Phone   [phone]             Social Security Number       Insurance Information   BCBS    Marital Status   Single    Confucianism   None               Admission Information    Arrival Date/Time: 2023 1505 Admit Date/Time: 2023 1505 IP Adm.  Date/Time: 2023 1916   Admission Type: Elective Point of Origin: Non-health Care Facility/self Admit Category:    Means of Arrival:  Primary Service: Obstetrics Secondary Service: N/A   Transfer Source:  Service Area: [facility] Unit: [address] [place] [address] Provider: Cheryl Jackman MD Attending Provider: Maurice Salas MD Referring Provider:      Patient Diagnoses    Reasons for Admission Coded Admission Diagnoses    *37 weeks gestation of pregnancy [Z3A.37]    Term  delivered by , current hospitalization [Z38.01]     Insurance Payors as of 2023    BCBS    Plan: [place] Group: [id number] Member: [id number]   Effective from: 2016 Subscriber: Cathryn Schilder ID: [id number]   Guarantor: Kaela Barrett COMPLETE CARE OF [place]    Plan: Nati Nur [place] Member: [id number] Effective from: 2023   Subscriber: Chanell Rollins ID: [id number] Guarantor: Ricardo Campbell to Patient    Power of  Living Will Clinical Unknown Study Attachment Consent Form ABN Waiver After Visit Summary Lab Result Scan Code

## 2023-09-22 VITALS
SYSTOLIC BLOOD PRESSURE: 131 MMHG | HEART RATE: 105 BPM | RESPIRATION RATE: 18 BRPM | OXYGEN SATURATION: 100 % | TEMPERATURE: 98.1 F | HEIGHT: 64 IN | WEIGHT: 255 LBS | DIASTOLIC BLOOD PRESSURE: 84 MMHG | BODY MASS INDEX: 43.54 KG/M2

## 2023-09-22 PROCEDURE — 6370000000 HC RX 637 (ALT 250 FOR IP): Performed by: OBSTETRICS & GYNECOLOGY

## 2023-09-22 RX ADMIN — OXYCODONE HYDROCHLORIDE 10 MG: 5 TABLET ORAL at 10:56

## 2023-09-22 RX ADMIN — IBUPROFEN 600 MG: 600 TABLET ORAL at 05:38

## 2023-09-22 RX ADMIN — DOCUSATE SODIUM 100 MG: 100 CAPSULE, LIQUID FILLED ORAL at 09:19

## 2023-09-22 RX ADMIN — OXYCODONE HYDROCHLORIDE 5 MG: 5 TABLET ORAL at 05:38

## 2023-09-22 ASSESSMENT — PAIN - FUNCTIONAL ASSESSMENT
PAIN_FUNCTIONAL_ASSESSMENT: ACTIVITIES ARE NOT PREVENTED
PAIN_FUNCTIONAL_ASSESSMENT: ACTIVITIES ARE NOT PREVENTED

## 2023-09-22 ASSESSMENT — PAIN DESCRIPTION - DESCRIPTORS
DESCRIPTORS: CRAMPING
DESCRIPTORS: THROBBING

## 2023-09-22 ASSESSMENT — PAIN DESCRIPTION - ORIENTATION
ORIENTATION: LOWER
ORIENTATION: LOWER

## 2023-09-22 ASSESSMENT — PAIN SCALES - GENERAL
PAINLEVEL_OUTOF10: 6
PAINLEVEL_OUTOF10: 7

## 2023-09-22 ASSESSMENT — PAIN DESCRIPTION - LOCATION
LOCATION: ABDOMEN
LOCATION: ABDOMEN;INCISION

## 2023-09-22 NOTE — DISCHARGE SUMMARY
Discharge Summary     Patient: John Juarez MRN: [id number]  SSN: [id number]    YOB: 2000  Age: 21 y.o. Sex: female       Admit Date: 2023    Discharge Date: 2023      Admission Diagnoses: 40 weeks gestation of pregnancy [Z3A.37]  Term  delivered by , current hospitalization [Z38.01]    Discharge Diagnoses:   Status post low transverse  section    Discharge Condition: Good    Hospital Course: Patient was admitted to the hospital and underwent elective low transverse  section. Procedure was performed without complication. She did well postoperatively and was able to tolerate a regular diet on the first postoperative day. She remained afebrile throughout her hospital course and was subsequently discharged home on the second postoperative day in good condition. Significant Diagnostic Studies: None    Disposition: Home self-care    Discharge Medications:   Current Discharge Medication List        START taking these medications    Details   ibuprofen (ADVIL;MOTRIN) 600 MG tablet Take 1 tablet by mouth 4 times daily as needed for Pain  Qty: 40 tablet, Refills: 0      oxyCODONE-acetaminophen (PERCOCET) 5-325 MG per tablet Take 1 tablet by mouth every 6 hours as needed for Pain for up to 7 days. Intended supply: 3 days. Take lowest dose possible to manage pain Max Daily Amount: 4 tablets  Qty: 20 tablet, Refills: 0    Comments: Reduce doses taken as pain becomes manageable  Associated Diagnoses: Postoperative pain           STOP taking these medications       ferrous sulfate (IRON 325) 325 (65 Fe) MG tablet Comments:   Reason for Stopping:               Activity: Increase activity as tolerated  Diet: Regular  Wound Care: Keep wound clean and dry    No follow-up provider specified.      Signed By: Ayah Jones MD     2023

## 2023-09-22 NOTE — PROGRESS NOTES
Discharge instructions printed out and provided to patient. Patient verbalized instructions explained to her with all questions answered. Patient instructed to call Dr Elaine Tellez office to schedule 1 week follow-up appointment to have ELIECER dressing removed. Patient knows when to call MD or [phone]- Patient discharged via wheelchair to front lobby. Baby in car seat.

## 2023-09-26 ENCOUNTER — POSTPARTUM VISIT (OUTPATIENT)
Age: 23
End: 2023-09-26

## 2023-09-26 VITALS
DIASTOLIC BLOOD PRESSURE: 100 MMHG | SYSTOLIC BLOOD PRESSURE: 149 MMHG | WEIGHT: 246 LBS | HEIGHT: 64 IN | BODY MASS INDEX: 42 KG/M2

## 2023-09-26 DIAGNOSIS — O16.9 HYPERTENSION IN PREGNANCY, DELIVERED: ICD-10-CM

## 2023-09-26 DIAGNOSIS — F90.9 ATTENTION DEFICIT HYPERACTIVITY DISORDER (ADHD), UNSPECIFIED ADHD TYPE: Primary | ICD-10-CM

## 2023-09-26 PROCEDURE — 99024 POSTOP FOLLOW-UP VISIT: CPT | Performed by: OBSTETRICS & GYNECOLOGY

## 2023-09-26 RX ORDER — LABETALOL 200 MG/1
200 TABLET, FILM COATED ORAL 2 TIMES DAILY
Qty: 60 TABLET | Refills: 3 | Status: SHIPPED | OUTPATIENT
Start: 2023-09-26

## 2023-09-26 RX ORDER — DEXTROAMPHETAMINE SACCHARATE, AMPHETAMINE ASPARTATE MONOHYDRATE, DEXTROAMPHETAMINE SULFATE AND AMPHETAMINE SULFATE 7.5; 7.5; 7.5; 7.5 MG/1; MG/1; MG/1; MG/1
30 CAPSULE, EXTENDED RELEASE ORAL EVERY MORNING
Qty: 30 CAPSULE | Refills: 0 | Status: SHIPPED | OUTPATIENT
Start: 2023-09-26 | End: 2023-10-26

## 2023-09-27 NOTE — ANESTHESIA POSTPROCEDURE EVALUATION
Department of Anesthesiology  Postprocedure Note    Patient: Heide Dumas  MRN: [id number]  YOB: 2000  Date of evaluation: 2023      Procedure Summary     Date: 23 Room / Location: [facility] L&D OR    Anesthesia Start: 1006 Anesthesia Stop: 1119    Procedure:  SECTION (Abdomen) Diagnosis:     Surgeons: Abraham Villeda MD Responsible Provider: Elle Salguero MD    Anesthesia Type: Spinal ASA Status: 4          Anesthesia Type: Spinal    Misti Phase I: Misti Score: 10    Misti Phase II:        Anesthesia Post Evaluation    Patient location during evaluation: floor (L&D Suite)  Patient participation: complete - patient participated  Level of consciousness: awake and alert  Pain score: 0  Airway patency: patent  Nausea & Vomiting: no vomiting and no nausea  Complications: no  Cardiovascular status: hemodynamically stable  Respiratory status: acceptable  Hydration status: stable  Comments: The patient was transferred from the obstetric operating room to the L&D patient room. The patient was hemodynamically stable. Handoff was given to the nursing staff. All pre-, intra-, and postoperative questions were answered.   Multimodal analgesia pain management approach

## 2023-10-25 ENCOUNTER — POSTPARTUM VISIT (OUTPATIENT)
Age: 23
End: 2023-10-25

## 2023-10-25 VITALS
WEIGHT: 220.25 LBS | DIASTOLIC BLOOD PRESSURE: 91 MMHG | BODY MASS INDEX: 37.6 KG/M2 | HEIGHT: 64 IN | SYSTOLIC BLOOD PRESSURE: 133 MMHG

## 2023-10-25 DIAGNOSIS — N92.6 IRREGULAR MENSES: ICD-10-CM

## 2023-10-25 PROCEDURE — 0503F POSTPARTUM CARE VISIT: CPT | Performed by: OBSTETRICS & GYNECOLOGY

## 2023-10-25 RX ORDER — ETONOGESTREL AND ETHINYL ESTRADIOL 11.7; 2.7 MG/1; MG/1
1 INSERT, EXTENDED RELEASE VAGINAL
Qty: 3 EACH | Refills: 5 | Status: SHIPPED | OUTPATIENT
Start: 2023-10-25

## 2023-10-25 NOTE — PROGRESS NOTES
Glenn Monsivais is a 21 y.o. female who presents today for the following:  Chief Complaint   Patient presents with    Postpartum Care        No Known Allergies    Current Outpatient Medications   Medication Sig Dispense Refill    etonogestrel-ethinyl estradiol (NUVARING) 0.12-0.015 MG/24HR vaginal ring Place 1 each vaginally every 21 days Insert one (1) ring vaginally and leave in place for three (3) weeks, then remove for one (1) week. 3 each 5    amphetamine-dextroamphetamine (ADDERALL XR) 30 MG extended release capsule Take 1 capsule by mouth every morning for 30 days. Max Daily Amount: 30 mg 30 capsule 0    labetalol (NORMODYNE) 200 MG tablet Take 1 tablet by mouth 2 times daily 60 tablet 3    ibuprofen (ADVIL;MOTRIN) 600 MG tablet Take 1 tablet by mouth 4 times daily as needed for Pain 40 tablet 0     No current facility-administered medications for this visit.        Past Medical History:   Diagnosis Date    Calculus of kidney     Headache     PCOS (polycystic ovarian syndrome)        Past Surgical History:   Procedure Laterality Date     SECTION N/A 2023     SECTION performed by Geoff Chavez MD at [facility] L&D OR       Family History   Problem Relation Age of Onset    Hypertension Mother        Social History     Socioeconomic History    Marital status: Single     Spouse name: Not on file    Number of children: Not on file    Years of education: Not on file    Highest education level: Not on file   Occupational History    Not on file   Tobacco Use    Smoking status: Never     Passive exposure: Never    Smokeless tobacco: Never   Vaping Use    Vaping Use: Former   Substance and Sexual Activity    Alcohol use: Never    Drug use: Never    Sexual activity: Yes     Partners: Male     Birth control/protection: None   Other Topics Concern    Not on file   Social History Narrative    Not on file     Social Determinants of Health     Financial Resource Strain: Not on file   Food Insecurity: Not on

## 2024-01-04 ENCOUNTER — APPOINTMENT (OUTPATIENT)
Facility: HOSPITAL | Age: 24
End: 2024-01-04
Payer: COMMERCIAL

## 2024-01-04 ENCOUNTER — HOSPITAL ENCOUNTER (EMERGENCY)
Facility: HOSPITAL | Age: 24
Discharge: HOME | End: 2024-01-04
Attending: STUDENT IN AN ORGANIZED HEALTH CARE EDUCATION/TRAINING PROGRAM
Payer: COMMERCIAL

## 2024-01-04 VITALS
WEIGHT: 235 LBS | HEIGHT: 64 IN | RESPIRATION RATE: 16 BRPM | TEMPERATURE: 98.2 F | BODY MASS INDEX: 40.12 KG/M2 | SYSTOLIC BLOOD PRESSURE: 155 MMHG | DIASTOLIC BLOOD PRESSURE: 110 MMHG | HEART RATE: 97 BPM | OXYGEN SATURATION: 100 %

## 2024-01-04 DIAGNOSIS — K80.20 CALCULUS OF GALLBLADDER WITHOUT CHOLECYSTITIS WITHOUT OBSTRUCTION: Primary | ICD-10-CM

## 2024-01-04 DIAGNOSIS — N39.0 URINARY TRACT INFECTION WITHOUT HEMATURIA, SITE UNSPECIFIED: ICD-10-CM

## 2024-01-04 LAB
ALBUMIN SERPL-MCNC: 3.5 G/DL (ref 3.5–5)
ALBUMIN/GLOB SERPL: 0.8 (ref 1.1–2.2)
ALP SERPL-CCNC: 105 U/L (ref 45–117)
ALT SERPL-CCNC: 15 U/L (ref 12–78)
ANION GAP SERPL CALC-SCNC: 5 MMOL/L (ref 5–15)
APPEARANCE UR: ABNORMAL
AST SERPL W P-5'-P-CCNC: ABNORMAL U/L (ref 15–37)
BACTERIA URNS QL MICRO: ABNORMAL /HPF
BASOPHILS # BLD: 0 K/UL (ref 0–0.1)
BASOPHILS NFR BLD: 0 % (ref 0–1)
BILIRUB SERPL-MCNC: 0.2 MG/DL (ref 0.2–1)
BILIRUB UR QL: NEGATIVE
BUN SERPL-MCNC: 8 MG/DL (ref 6–20)
BUN/CREAT SERPL: 11 (ref 12–20)
CA-I BLD-MCNC: 9.7 MG/DL (ref 8.5–10.1)
CHLORIDE SERPL-SCNC: 109 MMOL/L (ref 97–108)
CO2 SERPL-SCNC: 25 MMOL/L (ref 21–32)
COLOR UR: ABNORMAL
CREAT SERPL-MCNC: 0.72 MG/DL (ref 0.55–1.02)
DIFFERENTIAL METHOD BLD: ABNORMAL
EOSINOPHIL # BLD: 0.4 K/UL (ref 0–0.4)
EOSINOPHIL NFR BLD: 4 % (ref 0–7)
EPITH CASTS URNS QL MICRO: ABNORMAL /LPF
ERYTHROCYTE [DISTWIDTH] IN BLOOD BY AUTOMATED COUNT: 16.4 % (ref 11.5–14.5)
GLOBULIN SER CALC-MCNC: 4.2 G/DL (ref 2–4)
GLUCOSE SERPL-MCNC: 86 MG/DL (ref 65–100)
GLUCOSE UR STRIP.AUTO-MCNC: NEGATIVE MG/DL
HCG SERPL QL: NEGATIVE
HCT VFR BLD AUTO: 39 % (ref 35–47)
HGB BLD-MCNC: 11.6 G/DL (ref 11.5–16)
HGB UR QL STRIP: ABNORMAL
IMM GRANULOCYTES # BLD AUTO: 0 K/UL (ref 0–0.04)
IMM GRANULOCYTES NFR BLD AUTO: 0 % (ref 0–0.5)
KETONES UR QL STRIP.AUTO: NEGATIVE MG/DL
LEUKOCYTE ESTERASE UR QL STRIP.AUTO: ABNORMAL
LIPASE SERPL-CCNC: 28 U/L (ref 13–75)
LYMPHOCYTES # BLD: 2.1 K/UL (ref 0.8–3.5)
LYMPHOCYTES NFR BLD: 21 % (ref 12–49)
MCH RBC QN AUTO: 21.4 PG (ref 26–34)
MCHC RBC AUTO-ENTMCNC: 29.7 G/DL (ref 30–36.5)
MCV RBC AUTO: 72.1 FL (ref 80–99)
MONOCYTES # BLD: 0.7 K/UL (ref 0–1)
MONOCYTES NFR BLD: 7 % (ref 5–13)
MUCOUS THREADS URNS QL MICRO: ABNORMAL /LPF
NEUTS SEG # BLD: 6.7 K/UL (ref 1.8–8)
NEUTS SEG NFR BLD: 68 % (ref 32–75)
NITRITE UR QL STRIP.AUTO: NEGATIVE
NRBC # BLD: 0 K/UL (ref 0–0.01)
NRBC BLD-RTO: 0 PER 100 WBC
OTHER: ABNORMAL
PH UR STRIP: 6 (ref 5–8)
PLATELET # BLD AUTO: 466 K/UL (ref 150–400)
PMV BLD AUTO: 9.1 FL (ref 8.9–12.9)
POTASSIUM SERPL-SCNC: ABNORMAL MMOL/L (ref 3.5–5.1)
PROT SERPL-MCNC: 7.7 G/DL (ref 6.4–8.2)
PROT UR STRIP-MCNC: NEGATIVE MG/DL
RBC # BLD AUTO: 5.41 M/UL (ref 3.8–5.2)
RBC #/AREA URNS HPF: ABNORMAL /HPF (ref 0–5)
SODIUM SERPL-SCNC: 139 MMOL/L (ref 136–145)
SP GR UR REFRACTOMETRY: 1.01 (ref 1–1.03)
TROPONIN I SERPL HS-MCNC: 12 NG/L (ref 0–51)
URINE CULTURE IF INDICATED: ABNORMAL
UROBILINOGEN UR QL STRIP.AUTO: 0.1 EU/DL (ref 0.1–1)
WBC # BLD AUTO: 9.9 K/UL (ref 3.6–11)
WBC URNS QL MICRO: ABNORMAL /HPF (ref 0–4)

## 2024-01-04 PROCEDURE — 87086 URINE CULTURE/COLONY COUNT: CPT

## 2024-01-04 PROCEDURE — 96374 THER/PROPH/DIAG INJ IV PUSH: CPT

## 2024-01-04 PROCEDURE — 99284 EMERGENCY DEPT VISIT MOD MDM: CPT

## 2024-01-04 PROCEDURE — 81001 URINALYSIS AUTO W/SCOPE: CPT

## 2024-01-04 PROCEDURE — 84703 CHORIONIC GONADOTROPIN ASSAY: CPT

## 2024-01-04 PROCEDURE — 80053 COMPREHEN METABOLIC PANEL: CPT

## 2024-01-04 PROCEDURE — 6360000002 HC RX W HCPCS: Performed by: STUDENT IN AN ORGANIZED HEALTH CARE EDUCATION/TRAINING PROGRAM

## 2024-01-04 PROCEDURE — 93005 ELECTROCARDIOGRAM TRACING: CPT | Performed by: STUDENT IN AN ORGANIZED HEALTH CARE EDUCATION/TRAINING PROGRAM

## 2024-01-04 PROCEDURE — 84484 ASSAY OF TROPONIN QUANT: CPT

## 2024-01-04 PROCEDURE — 36415 COLL VENOUS BLD VENIPUNCTURE: CPT

## 2024-01-04 PROCEDURE — 85025 COMPLETE CBC W/AUTO DIFF WBC: CPT

## 2024-01-04 PROCEDURE — 83690 ASSAY OF LIPASE: CPT

## 2024-01-04 PROCEDURE — 76705 ECHO EXAM OF ABDOMEN: CPT

## 2024-01-04 RX ORDER — CEPHALEXIN 500 MG/1
500 CAPSULE ORAL 2 TIMES DAILY
Qty: 14 CAPSULE | Refills: 0 | Status: SHIPPED | OUTPATIENT
Start: 2024-01-04 | End: 2024-01-11

## 2024-01-04 RX ORDER — KETOROLAC TROMETHAMINE 30 MG/ML
15 INJECTION, SOLUTION INTRAMUSCULAR; INTRAVENOUS
Status: COMPLETED | OUTPATIENT
Start: 2024-01-04 | End: 2024-01-04

## 2024-01-04 RX ADMIN — KETOROLAC TROMETHAMINE 15 MG: 30 INJECTION, SOLUTION INTRAMUSCULAR; INTRAVENOUS at 14:58

## 2024-01-04 ASSESSMENT — LIFESTYLE VARIABLES
HOW OFTEN DO YOU HAVE A DRINK CONTAINING ALCOHOL: NEVER
HOW MANY STANDARD DRINKS CONTAINING ALCOHOL DO YOU HAVE ON A TYPICAL DAY: PATIENT DOES NOT DRINK

## 2024-01-04 ASSESSMENT — PAIN SCALES - GENERAL: PAINLEVEL_OUTOF10: 10

## 2024-01-04 ASSESSMENT — PAIN DESCRIPTION - LOCATION: LOCATION: ABDOMEN;BACK

## 2024-01-04 NOTE — DISCHARGE INSTRUCTIONS
Thank you!  Thank you for allowing me to care for you in the emergency department. It is my goal to provide you with excellent care. If you have not received excellent quality care, please ask to speak to the nurse manager. Please fill out the survey that will come to you by mail or email since we listen to your feedback!     Below you will find a list of your tests from today's visit.  Should you have any questions, please do not hesitate to call the emergency department.    Labs  Recent Results (from the past 12 hour(s))   CBC with Auto Differential    Collection Time: 01/04/24  1:11 PM   Result Value Ref Range    WBC 9.9 3.6 - 11.0 K/uL    RBC 5.41 (H) 3.80 - 5.20 M/uL    Hemoglobin 11.6 11.5 - 16.0 g/dL    Hematocrit 39.0 35.0 - 47.0 %    MCV 72.1 (L) 80.0 - 99.0 FL    MCH 21.4 (L) 26.0 - 34.0 PG    MCHC 29.7 (L) 30.0 - 36.5 g/dL    RDW 16.4 (H) 11.5 - 14.5 %    Platelets 466 (H) 150 - 400 K/uL    MPV 9.1 8.9 - 12.9 FL    Nucleated RBCs 0.0 0.0  WBC    nRBC 0.00 0.00 - 0.01 K/uL    Neutrophils % 68 32 - 75 %    Lymphocytes % 21 12 - 49 %    Monocytes % 7 5 - 13 %    Eosinophils % 4 0 - 7 %    Basophils % 0 0 - 1 %    Immature Granulocytes 0 0 - 0.5 %    Neutrophils Absolute 6.7 1.8 - 8.0 K/UL    Lymphocytes Absolute 2.1 0.8 - 3.5 K/UL    Monocytes Absolute 0.7 0.0 - 1.0 K/UL    Eosinophils Absolute 0.4 0.0 - 0.4 K/UL    Basophils Absolute 0.0 0.0 - 0.1 K/UL    Absolute Immature Granulocyte 0.0 0.00 - 0.04 K/UL    Differential Type AUTOMATED     Comprehensive Metabolic Panel    Collection Time: 01/04/24  1:11 PM   Result Value Ref Range    Sodium 139 136 - 145 mmol/L    Potassium Hemolyzed, Recollection Recommended 3.5 - 5.1 mmol/L    Chloride 109 (H) 97 - 108 mmol/L    CO2 25 21 - 32 mmol/L    Anion Gap 5 5 - 15 mmol/L    Glucose 86 65 - 100 mg/dL    BUN 8 6 - 20 mg/dL    Creatinine 0.72 0.55 - 1.02 mg/dL    Bun/Cre Ratio 11 (L) 12 - 20      Est, Glom Filt Rate >60 >60 ml/min/1.73m2    Calcium 9.7

## 2024-01-04 NOTE — ED TRIAGE NOTES
Last filled 08/31/2020. Pt c/o back pain and abdominal pain intermittent for the last 2 weeks. Pt notes when the pain comes on she also starts to have some chest pain and feels SOB. Pt describes abdominal pain as a \"burning\" and rates the pain 10/10. Pt also notes some vomiting. Denies any urinary sxs.

## 2024-01-04 NOTE — ED NOTES
Provider at bedside for dispo and follow up, all treatments completed as ordered. Discharge plan reviewed and paperwork signed, teaching completed regarding treatment received, medications and follow up care demonstrated and read back, pt walked out of the ER with steady gait and no signs of acute distress.

## 2024-01-04 NOTE — ED NOTES
Pt reports she had a baby in September and was rushed to deliver due to her BP being elevated. Pt states BP went back to normal by her 6 week check up and has not had an issue since. Pts BP in triage 155/110.

## 2024-01-04 NOTE — ED PROVIDER NOTES
[facility] EMERGENCY DEPT  EMERGENCY DEPARTMENT HISTORY AND PHYSICAL EXAM      Date: 2024  Patient Name: Candice Mclaughlin  MRN: [id number]  YOB: 2000  Date of evaluation: 2024  Provider: Toño Hugo MD   Note Started: 2:37 PM EST 24    HISTORY OF PRESENT ILLNESS     Chief Complaint   Patient presents with    Back Pain    Abdominal Pain       History Provided By: Patient    HPI: Candice Mclaughlin is a 23 y.o. female history of GERD related to the most recent pregnancy delivered 3 months ago presents with abdominal pain.  Patient notes right upper quadrant abdominal pain that is typically worse a couple hours after eating.  She also notes subtle associated back pain.  She has had some feelings of nausea and emesis, but no current nausea now.  Outside of her pain, she is tolerating p.o.  She has a history of nephrolithiasis and said that this sensation is different.  Denies any dysuria or hematuria.  No vaginal bleeding or discharge reported.    PAST MEDICAL HISTORY   Past Medical History:  Past Medical History:   Diagnosis Date    Calculus of kidney     Headache     PCOS (polycystic ovarian syndrome)        Past Surgical History:  Past Surgical History:   Procedure Laterality Date     SECTION N/A 2023     SECTION performed by Becca Lopez MD at [facility] L&D OR       Family History:  Family History   Problem Relation Age of Onset    Hypertension Mother        Social History:  Social History     Tobacco Use    Smoking status: Never     Passive exposure: Never    Smokeless tobacco: Never   Vaping Use    Vaping Use: Former   Substance Use Topics    Alcohol use: Never    Drug use: Never       Allergies:  No Known Allergies    PCP: Margaret Mccormack APRN - NP    Current Meds:   Current Facility-Administered Medications   Medication Dose Route Frequency Provider Last Rate Last Admin    ketorolac (TORADOL) injection 15 mg  15 mg IntraVENous NOW Toño Hugo MD         Current

## 2024-01-05 LAB
EKG ATRIAL RATE: 88 BPM
EKG DIAGNOSIS: NORMAL
EKG P AXIS: 34 DEGREES
EKG P-R INTERVAL: 146 MS
EKG Q-T INTERVAL: 358 MS
EKG QRS DURATION: 94 MS
EKG QTC CALCULATION (BAZETT): 433 MS
EKG R AXIS: 18 DEGREES
EKG T AXIS: 23 DEGREES
EKG VENTRICULAR RATE: 88 BPM

## 2024-01-07 LAB
BACTERIA SPEC CULT: ABNORMAL
BACTERIA SPEC CULT: ABNORMAL
COLONY COUNT, CNT: ABNORMAL
Lab: ABNORMAL

## 2024-01-16 ENCOUNTER — OFFICE VISIT (OUTPATIENT)
Age: 24
End: 2024-01-16
Payer: COMMERCIAL

## 2024-01-16 VITALS
HEART RATE: 115 BPM | RESPIRATION RATE: 16 BRPM | BODY MASS INDEX: 38.07 KG/M2 | HEIGHT: 64 IN | WEIGHT: 223 LBS | SYSTOLIC BLOOD PRESSURE: 122 MMHG | TEMPERATURE: 98.3 F | DIASTOLIC BLOOD PRESSURE: 85 MMHG | OXYGEN SATURATION: 98 %

## 2024-01-16 DIAGNOSIS — K80.20 CALCULUS OF GALLBLADDER WITHOUT CHOLECYSTITIS WITHOUT OBSTRUCTION: Primary | ICD-10-CM

## 2024-01-16 PROCEDURE — 99204 OFFICE O/P NEW MOD 45 MIN: CPT | Performed by: COLON & RECTAL SURGERY

## 2024-01-16 ASSESSMENT — PATIENT HEALTH QUESTIONNAIRE - PHQ9
SUM OF ALL RESPONSES TO PHQ QUESTIONS 1-9: 0
1. LITTLE INTEREST OR PLEASURE IN DOING THINGS: 0
SUM OF ALL RESPONSES TO PHQ QUESTIONS 1-9: 0
SUM OF ALL RESPONSES TO PHQ QUESTIONS 1-9: 0
SUM OF ALL RESPONSES TO PHQ9 QUESTIONS 1 & 2: 0
SUM OF ALL RESPONSES TO PHQ QUESTIONS 1-9: 0

## 2024-01-16 NOTE — PROGRESS NOTES
Identified pt with two pt identifiers (name and ). Reviewed chart in preparation for visit and have obtained necessary documentation.    Candice Mclaughlin is a 23 y.o. female  Chief Complaint   Patient presents with    New Patient     Gallbladder issues complains of burning      /85 (Site: Left Upper Arm, Position: Sitting)   Pulse (!) 115   Temp 98.3 °F (36.8 °C) (Temporal)   Resp 16   Ht 1.626 m (5' 4\")   Wt 101.2 kg (223 lb)   LMP  (LMP Unknown)   SpO2 98%   BMI 38.28 kg/m²

## 2024-01-29 ENCOUNTER — TELEPHONE (OUTPATIENT)
Age: 24
End: 2024-01-29

## 2024-01-29 ENCOUNTER — PREP FOR PROCEDURE (OUTPATIENT)
Age: 24
End: 2024-01-29

## 2024-01-29 PROBLEM — K80.20 CALCULUS OF GALLBLADDER W/O MENTION OF CHOLECYSTITIS OR OBSTRUCTION: Status: ACTIVE | Noted: 2024-01-29

## 2024-02-01 ENCOUNTER — HOSPITAL ENCOUNTER (OUTPATIENT)
Facility: HOSPITAL | Age: 24
Discharge: HOME | End: 2024-02-01
Payer: COMMERCIAL

## 2024-02-01 VITALS
DIASTOLIC BLOOD PRESSURE: 105 MMHG | RESPIRATION RATE: 18 BRPM | SYSTOLIC BLOOD PRESSURE: 145 MMHG | TEMPERATURE: 97.8 F | HEART RATE: 131 BPM | HEIGHT: 65 IN | BODY MASS INDEX: 36.55 KG/M2 | WEIGHT: 219.4 LBS

## 2024-02-01 LAB
ERYTHROCYTE [DISTWIDTH] IN BLOOD BY AUTOMATED COUNT: 17.4 % (ref 11.5–14.5)
HCT VFR BLD AUTO: 40.3 % (ref 35–47)
HGB BLD-MCNC: 12.5 G/DL (ref 11.5–16)
MCH RBC QN AUTO: 22 PG (ref 26–34)
MCHC RBC AUTO-ENTMCNC: 31 G/DL (ref 30–36.5)
MCV RBC AUTO: 71 FL (ref 80–99)
NRBC # BLD: 0 K/UL (ref 0–0.01)
NRBC BLD-RTO: 0 PER 100 WBC
PLATELET # BLD AUTO: 466 K/UL (ref 150–400)
PMV BLD AUTO: 8.8 FL (ref 8.9–12.9)
RBC # BLD AUTO: 5.68 M/UL (ref 3.8–5.2)
WBC # BLD AUTO: 9.6 K/UL (ref 3.6–11)

## 2024-02-01 PROCEDURE — 85027 COMPLETE CBC AUTOMATED: CPT

## 2024-02-01 PROCEDURE — 93005 ELECTROCARDIOGRAM TRACING: CPT | Performed by: COLON & RECTAL SURGERY

## 2024-02-01 PROCEDURE — 83036 HEMOGLOBIN GLYCOSYLATED A1C: CPT

## 2024-02-01 PROCEDURE — 36415 COLL VENOUS BLD VENIPUNCTURE: CPT

## 2024-02-01 NOTE — PERIOP NOTE
Patient here for PAT visit, Heart rate 142 on arrival, left pulse ox on for a few minutes and came down to 131. /105 on arrival and down to 145/92. EKG performed and Sinus Tachycardia at 129-spoke with Dr. Cabral of anesthesia, he stated no other testing necessary and she can go home.   14:35 spoke with Dr. Oleary and informed him of EKG and HR and that she had been reviewed by anesthesia.  Questions asked and no new orders received.

## 2024-02-02 LAB
EKG ATRIAL RATE: 129 BPM
EKG DIAGNOSIS: NORMAL
EKG P AXIS: 38 DEGREES
EKG P-R INTERVAL: 144 MS
EKG Q-T INTERVAL: 284 MS
EKG QRS DURATION: 84 MS
EKG QTC CALCULATION (BAZETT): 416 MS
EKG R AXIS: 9 DEGREES
EKG T AXIS: 0 DEGREES
EKG VENTRICULAR RATE: 129 BPM

## 2024-02-03 LAB
EST. AVERAGE GLUCOSE BLD GHB EST-MCNC: 103 MG/DL
HBA1C MFR BLD: 5.2 % (ref 4–5.6)

## 2024-02-09 ENCOUNTER — ANESTHESIA EVENT (OUTPATIENT)
Facility: HOSPITAL | Age: 24
End: 2024-02-09
Payer: COMMERCIAL

## 2024-02-09 ENCOUNTER — ANESTHESIA (OUTPATIENT)
Facility: HOSPITAL | Age: 24
End: 2024-02-09
Payer: COMMERCIAL

## 2024-02-09 ENCOUNTER — HOSPITAL ENCOUNTER (OUTPATIENT)
Facility: HOSPITAL | Age: 24
Setting detail: OUTPATIENT SURGERY
Discharge: HOME | End: 2024-02-09
Attending: COLON & RECTAL SURGERY | Admitting: COLON & RECTAL SURGERY
Payer: COMMERCIAL

## 2024-02-09 VITALS
TEMPERATURE: 97.8 F | SYSTOLIC BLOOD PRESSURE: 144 MMHG | HEART RATE: 105 BPM | DIASTOLIC BLOOD PRESSURE: 94 MMHG | OXYGEN SATURATION: 96 % | RESPIRATION RATE: 16 BRPM

## 2024-02-09 DIAGNOSIS — K80.20 CALCULUS OF GALLBLADDER W/O MENTION OF CHOLECYSTITIS OR OBSTRUCTION: ICD-10-CM

## 2024-02-09 DIAGNOSIS — K80.20 CALCULUS OF GALLBLADDER WITHOUT CHOLECYSTITIS WITHOUT OBSTRUCTION: Primary | ICD-10-CM

## 2024-02-09 LAB
GLUCOSE BLD STRIP.AUTO-MCNC: 112 MG/DL (ref 65–100)
HCG UR QL: NEGATIVE
PERFORMED BY:: ABNORMAL

## 2024-02-09 PROCEDURE — 6360000002 HC RX W HCPCS: Performed by: COLON & RECTAL SURGERY

## 2024-02-09 PROCEDURE — 2500000003 HC RX 250 WO HCPCS: Performed by: NURSE ANESTHETIST, CERTIFIED REGISTERED

## 2024-02-09 PROCEDURE — 7100000001 HC PACU RECOVERY - ADDTL 15 MIN: Performed by: COLON & RECTAL SURGERY

## 2024-02-09 PROCEDURE — 6360000002 HC RX W HCPCS: Performed by: NURSE ANESTHETIST, CERTIFIED REGISTERED

## 2024-02-09 PROCEDURE — 7100000010 HC PHASE II RECOVERY - FIRST 15 MIN: Performed by: COLON & RECTAL SURGERY

## 2024-02-09 PROCEDURE — 3600000004 HC SURGERY LEVEL 4 BASE: Performed by: COLON & RECTAL SURGERY

## 2024-02-09 PROCEDURE — 82962 GLUCOSE BLOOD TEST: CPT

## 2024-02-09 PROCEDURE — 7100000011 HC PHASE II RECOVERY - ADDTL 15 MIN: Performed by: COLON & RECTAL SURGERY

## 2024-02-09 PROCEDURE — 2580000003 HC RX 258: Performed by: COLON & RECTAL SURGERY

## 2024-02-09 PROCEDURE — 3700000001 HC ADD 15 MINUTES (ANESTHESIA): Performed by: COLON & RECTAL SURGERY

## 2024-02-09 PROCEDURE — 88304 TISSUE EXAM BY PATHOLOGIST: CPT

## 2024-02-09 PROCEDURE — 6370000000 HC RX 637 (ALT 250 FOR IP): Performed by: ANESTHESIOLOGY

## 2024-02-09 PROCEDURE — 81025 URINE PREGNANCY TEST: CPT

## 2024-02-09 PROCEDURE — 2709999900 HC NON-CHARGEABLE SUPPLY: Performed by: COLON & RECTAL SURGERY

## 2024-02-09 PROCEDURE — 6360000002 HC RX W HCPCS

## 2024-02-09 PROCEDURE — 7100000000 HC PACU RECOVERY - FIRST 15 MIN: Performed by: COLON & RECTAL SURGERY

## 2024-02-09 PROCEDURE — 3700000000 HC ANESTHESIA ATTENDED CARE: Performed by: COLON & RECTAL SURGERY

## 2024-02-09 PROCEDURE — 2720000010 HC SURG SUPPLY STERILE: Performed by: COLON & RECTAL SURGERY

## 2024-02-09 PROCEDURE — 6360000002 HC RX W HCPCS: Performed by: ANESTHESIOLOGY

## 2024-02-09 PROCEDURE — 47562 LAPAROSCOPIC CHOLECYSTECTOMY: CPT | Performed by: COLON & RECTAL SURGERY

## 2024-02-09 PROCEDURE — 3600000014 HC SURGERY LEVEL 4 ADDTL 15MIN: Performed by: COLON & RECTAL SURGERY

## 2024-02-09 RX ORDER — DIPHENHYDRAMINE HYDROCHLORIDE 50 MG/ML
12.5 INJECTION INTRAMUSCULAR; INTRAVENOUS
Status: DISCONTINUED | OUTPATIENT
Start: 2024-02-09 | End: 2024-02-09 | Stop reason: HOSPADM

## 2024-02-09 RX ORDER — SODIUM CHLORIDE 9 MG/ML
INJECTION, SOLUTION INTRAVENOUS PRN
Status: DISCONTINUED | OUTPATIENT
Start: 2024-02-09 | End: 2024-02-09 | Stop reason: HOSPADM

## 2024-02-09 RX ORDER — FENTANYL CITRATE 50 UG/ML
INJECTION, SOLUTION INTRAMUSCULAR; INTRAVENOUS PRN
Status: DISCONTINUED | OUTPATIENT
Start: 2024-02-09 | End: 2024-02-09 | Stop reason: SDUPTHER

## 2024-02-09 RX ORDER — SODIUM CHLORIDE 0.9 % (FLUSH) 0.9 %
5-40 SYRINGE (ML) INJECTION EVERY 12 HOURS SCHEDULED
Status: DISCONTINUED | OUTPATIENT
Start: 2024-02-09 | End: 2024-02-09 | Stop reason: HOSPADM

## 2024-02-09 RX ORDER — LIDOCAINE 4 G/G
1 PATCH TOPICAL AS NEEDED
Status: DISCONTINUED | OUTPATIENT
Start: 2024-02-09 | End: 2024-02-09 | Stop reason: HOSPADM

## 2024-02-09 RX ORDER — ROCURONIUM BROMIDE 10 MG/ML
INJECTION, SOLUTION INTRAVENOUS PRN
Status: DISCONTINUED | OUTPATIENT
Start: 2024-02-09 | End: 2024-02-09 | Stop reason: SDUPTHER

## 2024-02-09 RX ORDER — ONDANSETRON 2 MG/ML
INJECTION INTRAMUSCULAR; INTRAVENOUS PRN
Status: DISCONTINUED | OUTPATIENT
Start: 2024-02-09 | End: 2024-02-09 | Stop reason: SDUPTHER

## 2024-02-09 RX ORDER — METOCLOPRAMIDE HYDROCHLORIDE 5 MG/ML
10 INJECTION INTRAMUSCULAR; INTRAVENOUS
Status: DISCONTINUED | OUTPATIENT
Start: 2024-02-09 | End: 2024-02-09 | Stop reason: HOSPADM

## 2024-02-09 RX ORDER — SODIUM CHLORIDE, SODIUM LACTATE, POTASSIUM CHLORIDE, CALCIUM CHLORIDE 600; 310; 30; 20 MG/100ML; MG/100ML; MG/100ML; MG/100ML
INJECTION, SOLUTION INTRAVENOUS CONTINUOUS
Status: DISCONTINUED | OUTPATIENT
Start: 2024-02-09 | End: 2024-02-09 | Stop reason: HOSPADM

## 2024-02-09 RX ORDER — LABETALOL HYDROCHLORIDE 5 MG/ML
5 INJECTION, SOLUTION INTRAVENOUS ONCE
Status: COMPLETED | OUTPATIENT
Start: 2024-02-09 | End: 2024-02-09

## 2024-02-09 RX ORDER — GLUCAGON 1 MG/ML
1 KIT INJECTION PRN
Status: DISCONTINUED | OUTPATIENT
Start: 2024-02-09 | End: 2024-02-09 | Stop reason: HOSPADM

## 2024-02-09 RX ORDER — SODIUM CHLORIDE 0.9 % (FLUSH) 0.9 %
5-40 SYRINGE (ML) INJECTION PRN
Status: DISCONTINUED | OUTPATIENT
Start: 2024-02-09 | End: 2024-02-09 | Stop reason: HOSPADM

## 2024-02-09 RX ORDER — FENTANYL CITRATE 50 UG/ML
INJECTION, SOLUTION INTRAMUSCULAR; INTRAVENOUS
Status: COMPLETED
Start: 2024-02-09 | End: 2024-02-09

## 2024-02-09 RX ORDER — LORAZEPAM 2 MG/ML
0.5 INJECTION INTRAMUSCULAR
Status: DISCONTINUED | OUTPATIENT
Start: 2024-02-09 | End: 2024-02-09 | Stop reason: HOSPADM

## 2024-02-09 RX ORDER — MIDAZOLAM HYDROCHLORIDE 1 MG/ML
INJECTION INTRAMUSCULAR; INTRAVENOUS PRN
Status: DISCONTINUED | OUTPATIENT
Start: 2024-02-09 | End: 2024-02-09 | Stop reason: SDUPTHER

## 2024-02-09 RX ORDER — LABETALOL HYDROCHLORIDE 5 MG/ML
INJECTION, SOLUTION INTRAVENOUS
Status: DISCONTINUED
Start: 2024-02-09 | End: 2024-02-09 | Stop reason: WASHOUT

## 2024-02-09 RX ORDER — OXYCODONE HYDROCHLORIDE 5 MG/1
10 TABLET ORAL PRN
Status: COMPLETED | OUTPATIENT
Start: 2024-02-09 | End: 2024-02-09

## 2024-02-09 RX ORDER — SODIUM CHLORIDE, SODIUM LACTATE, POTASSIUM CHLORIDE, CALCIUM CHLORIDE 600; 310; 30; 20 MG/100ML; MG/100ML; MG/100ML; MG/100ML
INJECTION, SOLUTION INTRAVENOUS ONCE
Status: DISCONTINUED | OUTPATIENT
Start: 2024-02-09 | End: 2024-02-09 | Stop reason: HOSPADM

## 2024-02-09 RX ORDER — OXYCODONE HYDROCHLORIDE AND ACETAMINOPHEN 5; 325 MG/1; MG/1
1 TABLET ORAL EVERY 4 HOURS PRN
Status: DISCONTINUED | OUTPATIENT
Start: 2024-02-09 | End: 2024-02-09 | Stop reason: HOSPADM

## 2024-02-09 RX ORDER — EPHEDRINE SULFATE 50 MG/ML
INJECTION INTRAVENOUS PRN
Status: DISCONTINUED | OUTPATIENT
Start: 2024-02-09 | End: 2024-02-09 | Stop reason: SDUPTHER

## 2024-02-09 RX ORDER — DEXMEDETOMIDINE HYDROCHLORIDE 100 UG/ML
INJECTION, SOLUTION INTRAVENOUS PRN
Status: DISCONTINUED | OUTPATIENT
Start: 2024-02-09 | End: 2024-02-09 | Stop reason: SDUPTHER

## 2024-02-09 RX ORDER — OXYCODONE HYDROCHLORIDE AND ACETAMINOPHEN 5; 325 MG/1; MG/1
1 TABLET ORAL EVERY 4 HOURS PRN
Qty: 24 TABLET | Refills: 0 | Status: SHIPPED | OUTPATIENT
Start: 2024-02-09 | End: 2024-02-14

## 2024-02-09 RX ORDER — DEXAMETHASONE SODIUM PHOSPHATE 4 MG/ML
INJECTION, SOLUTION INTRA-ARTICULAR; INTRALESIONAL; INTRAMUSCULAR; INTRAVENOUS; SOFT TISSUE PRN
Status: DISCONTINUED | OUTPATIENT
Start: 2024-02-09 | End: 2024-02-09 | Stop reason: SDUPTHER

## 2024-02-09 RX ORDER — CEFAZOLIN SODIUM 1 G/3ML
INJECTION, POWDER, FOR SOLUTION INTRAMUSCULAR; INTRAVENOUS PRN
Status: DISCONTINUED | OUTPATIENT
Start: 2024-02-09 | End: 2024-02-09 | Stop reason: SDUPTHER

## 2024-02-09 RX ORDER — HYDROMORPHONE HYDROCHLORIDE 1 MG/ML
INJECTION, SOLUTION INTRAMUSCULAR; INTRAVENOUS; SUBCUTANEOUS PRN
Status: DISCONTINUED | OUTPATIENT
Start: 2024-02-09 | End: 2024-02-09 | Stop reason: SDUPTHER

## 2024-02-09 RX ORDER — BUPIVACAINE HYDROCHLORIDE 5 MG/ML
INJECTION, SOLUTION PERINEURAL PRN
Status: DISCONTINUED | OUTPATIENT
Start: 2024-02-09 | End: 2024-02-09 | Stop reason: HOSPADM

## 2024-02-09 RX ORDER — FENTANYL CITRATE 50 UG/ML
50 INJECTION, SOLUTION INTRAMUSCULAR; INTRAVENOUS EVERY 5 MIN PRN
Status: DISCONTINUED | OUTPATIENT
Start: 2024-02-09 | End: 2024-02-09 | Stop reason: HOSPADM

## 2024-02-09 RX ORDER — HYDRALAZINE HYDROCHLORIDE 20 MG/ML
10 INJECTION INTRAMUSCULAR; INTRAVENOUS
Status: DISCONTINUED | OUTPATIENT
Start: 2024-02-09 | End: 2024-02-09

## 2024-02-09 RX ORDER — LIDOCAINE HYDROCHLORIDE 20 MG/ML
INJECTION, SOLUTION EPIDURAL; INFILTRATION; INTRACAUDAL; PERINEURAL PRN
Status: DISCONTINUED | OUTPATIENT
Start: 2024-02-09 | End: 2024-02-09 | Stop reason: SDUPTHER

## 2024-02-09 RX ORDER — ONDANSETRON 2 MG/ML
4 INJECTION INTRAMUSCULAR; INTRAVENOUS
Status: DISCONTINUED | OUTPATIENT
Start: 2024-02-09 | End: 2024-02-09 | Stop reason: HOSPADM

## 2024-02-09 RX ORDER — LABETALOL HYDROCHLORIDE 5 MG/ML
10 INJECTION, SOLUTION INTRAVENOUS
Status: DISCONTINUED | OUTPATIENT
Start: 2024-02-09 | End: 2024-02-09

## 2024-02-09 RX ORDER — IPRATROPIUM BROMIDE AND ALBUTEROL SULFATE 2.5; .5 MG/3ML; MG/3ML
1 SOLUTION RESPIRATORY (INHALATION)
Status: DISCONTINUED | OUTPATIENT
Start: 2024-02-09 | End: 2024-02-09 | Stop reason: HOSPADM

## 2024-02-09 RX ORDER — SUCCINYLCHOLINE/SOD CL,ISO/PF 200MG/10ML
SYRINGE (ML) INTRAVENOUS PRN
Status: DISCONTINUED | OUTPATIENT
Start: 2024-02-09 | End: 2024-02-09 | Stop reason: SDUPTHER

## 2024-02-09 RX ORDER — DEXTROSE MONOHYDRATE 100 MG/ML
INJECTION, SOLUTION INTRAVENOUS CONTINUOUS PRN
Status: DISCONTINUED | OUTPATIENT
Start: 2024-02-09 | End: 2024-02-09 | Stop reason: HOSPADM

## 2024-02-09 RX ORDER — HYDROMORPHONE HYDROCHLORIDE 1 MG/ML
0.5 INJECTION, SOLUTION INTRAMUSCULAR; INTRAVENOUS; SUBCUTANEOUS EVERY 5 MIN PRN
Status: DISCONTINUED | OUTPATIENT
Start: 2024-02-09 | End: 2024-02-09 | Stop reason: HOSPADM

## 2024-02-09 RX ORDER — OXYCODONE HYDROCHLORIDE AND ACETAMINOPHEN 5; 325 MG/1; MG/1
2 TABLET ORAL EVERY 4 HOURS PRN
Status: DISCONTINUED | OUTPATIENT
Start: 2024-02-09 | End: 2024-02-09 | Stop reason: HOSPADM

## 2024-02-09 RX ORDER — OXYCODONE HYDROCHLORIDE 5 MG/1
5 TABLET ORAL PRN
Status: COMPLETED | OUTPATIENT
Start: 2024-02-09 | End: 2024-02-09

## 2024-02-09 RX ORDER — MEPERIDINE HYDROCHLORIDE 25 MG/ML
12.5 INJECTION INTRAMUSCULAR; INTRAVENOUS; SUBCUTANEOUS EVERY 5 MIN PRN
Status: DISCONTINUED | OUTPATIENT
Start: 2024-02-09 | End: 2024-02-09 | Stop reason: HOSPADM

## 2024-02-09 RX ADMIN — CEFAZOLIN SODIUM 2 G: 1 INJECTION, POWDER, FOR SOLUTION INTRAMUSCULAR; INTRAVENOUS at 10:10

## 2024-02-09 RX ADMIN — SODIUM CHLORIDE, POTASSIUM CHLORIDE, SODIUM LACTATE AND CALCIUM CHLORIDE: 600; 310; 30; 20 INJECTION, SOLUTION INTRAVENOUS at 10:10

## 2024-02-09 RX ADMIN — LABETALOL HYDROCHLORIDE 5 MG: 5 INJECTION, SOLUTION INTRAVENOUS at 11:45

## 2024-02-09 RX ADMIN — PROPOFOL 50 MG: 10 INJECTION, EMULSION INTRAVENOUS at 10:58

## 2024-02-09 RX ADMIN — DEXMEDETOMIDINE 12 MCG: 100 INJECTION, SOLUTION INTRAVENOUS at 10:31

## 2024-02-09 RX ADMIN — FENTANYL CITRATE 100 MCG: 50 INJECTION, SOLUTION INTRAMUSCULAR; INTRAVENOUS at 10:18

## 2024-02-09 RX ADMIN — LABETALOL HYDROCHLORIDE 5 MG: 5 INJECTION INTRAVENOUS at 11:45

## 2024-02-09 RX ADMIN — Medication 120 MG: at 10:18

## 2024-02-09 RX ADMIN — ROCURONIUM BROMIDE 30 MG: 10 INJECTION, SOLUTION INTRAVENOUS at 10:24

## 2024-02-09 RX ADMIN — LIDOCAINE HYDROCHLORIDE 100 MG: 20 INJECTION, SOLUTION EPIDURAL; INFILTRATION; INTRACAUDAL; PERINEURAL at 10:18

## 2024-02-09 RX ADMIN — SUGAMMADEX 200 MG: 100 INJECTION, SOLUTION INTRAVENOUS at 11:10

## 2024-02-09 RX ADMIN — HYDROMORPHONE HYDROCHLORIDE 1 MG: 1 INJECTION, SOLUTION INTRAMUSCULAR; INTRAVENOUS; SUBCUTANEOUS at 10:18

## 2024-02-09 RX ADMIN — DEXAMETHASONE SODIUM PHOSPHATE 4 MG: 4 INJECTION, SOLUTION INTRA-ARTICULAR; INTRALESIONAL; INTRAMUSCULAR; INTRAVENOUS; SOFT TISSUE at 10:28

## 2024-02-09 RX ADMIN — PROPOFOL 150 MG: 10 INJECTION, EMULSION INTRAVENOUS at 10:18

## 2024-02-09 RX ADMIN — FENTANYL CITRATE 50 MCG: 50 INJECTION, SOLUTION INTRAMUSCULAR; INTRAVENOUS at 11:40

## 2024-02-09 RX ADMIN — OXYCODONE 10 MG: 5 TABLET ORAL at 12:18

## 2024-02-09 RX ADMIN — ONDANSETRON 4 MG: 2 INJECTION INTRAMUSCULAR; INTRAVENOUS at 10:28

## 2024-02-09 RX ADMIN — EPHEDRINE SULFATE 20 MG: 50 INJECTION INTRAVENOUS at 10:48

## 2024-02-09 RX ADMIN — FENTANYL CITRATE 50 MCG: 50 INJECTION, SOLUTION INTRAMUSCULAR; INTRAVENOUS at 12:00

## 2024-02-09 RX ADMIN — MIDAZOLAM HYDROCHLORIDE 2 MG: 2 INJECTION, SOLUTION INTRAMUSCULAR; INTRAVENOUS at 10:10

## 2024-02-09 ASSESSMENT — PAIN - FUNCTIONAL ASSESSMENT
PAIN_FUNCTIONAL_ASSESSMENT: FACE, LEGS, ACTIVITY, CRY, AND CONSOLABILITY (FLACC)
PAIN_FUNCTIONAL_ASSESSMENT: FACE, LEGS, ACTIVITY, CRY, AND CONSOLABILITY (FLACC)
PAIN_FUNCTIONAL_ASSESSMENT: 0-10
PAIN_FUNCTIONAL_ASSESSMENT: FACE, LEGS, ACTIVITY, CRY, AND CONSOLABILITY (FLACC)
PAIN_FUNCTIONAL_ASSESSMENT: 0-10
PAIN_FUNCTIONAL_ASSESSMENT: 0-10

## 2024-02-09 ASSESSMENT — PAIN DESCRIPTION - DESCRIPTORS
DESCRIPTORS: ACHING
DESCRIPTORS: CRAMPING

## 2024-02-09 NOTE — BRIEF OP NOTE
Brief Postoperative Note      Patient: Candice Mclaughlin  YOB: 2000  MRN: [id number]    Date of Procedure: 2/9/2024    Pre-Op Diagnosis Codes:     * Calculus of gallbladder w/o mention of cholecystitis or obstruction [K80.20]    Post-Op Diagnosis: Same       Procedure(s):  LAPAROSCOPIC CHOLECYSTECTOMY    Surgeon(s):  Darinel Crawford MD    Assistant:  * No surgical staff found *    Anesthesia: General    Estimated Blood Loss (mL): less than 50     Complications: None    Specimens:   ID Type Source Tests Collected by Time Destination   1 : gallbladder Tissue Gallbladder SURGICAL PATHOLOGY Darinel Crawford MD 2/9/2024 1107        Implants:  * No implants in log *      Drains: * No LDAs found *    Findings: Thin-walled gallbladder with multiple small stones      Electronically signed by DARINEL CRAWFORD MD on 2/9/2024 at 11:21 AM

## 2024-02-09 NOTE — PROGRESS NOTES
1740 Patients mother called regarding medications for nausea and to ask what can be done regarding pain, as the patients pain medicine is not touching her pain for longer than 5-10 mins. Dr Oleary stated that the patiennt can take 2 tabs of the prescribed pain medication for the next 12 hours.

## 2024-02-09 NOTE — ANESTHESIA PRE PROCEDURE
Department of Anesthesiology  Preprocedure Note       Name:  Candice Mclaughlin   Age:  23 y.o.  :  2000                                          MRN:  [id number]         Date:  2024      Surgeon: Surgeon(s):  Terese Oleary MD    Procedure: Procedure(s):  LAPAROSCOPIC CHOLECYSTECTOMY    Medications prior to admission:   Prior to Admission medications    Medication Sig Start Date End Date Taking? Authorizing Provider   etonogestrel-ethinyl estradiol (NUVARING) 0.12-0.015 MG/24HR vaginal ring Place 1 each vaginally every 21 days Insert one (1) ring vaginally and leave in place for three (3) weeks, then remove for one (1) week. 10/25/23   Becca Lopez MD   amphetamine-dextroamphetamine (ADDERALL XR) 30 MG extended release capsule Take 1 capsule by mouth every morning for 30 days. Max Daily Amount: 30 mg 23  Becca Lopez MD   labetalol (NORMODYNE) 200 MG tablet Take 1 tablet by mouth 2 times daily  Patient not taking: Reported on 2024   Becca Lopez MD   ibuprofen (ADVIL;MOTRIN) 600 MG tablet Take 1 tablet by mouth 4 times daily as needed for Pain  Patient not taking: Reported on 2024   Becca Lopez MD       Current medications:    Current Facility-Administered Medications   Medication Dose Route Frequency Provider Last Rate Last Admin    lactated ringers IV soln infusion   IntraVENous Continuous Terese Oleary MD           Allergies:  No Known Allergies    Problem List:    Patient Active Problem List   Diagnosis Code    Pregnancy Z34.90    Normal pregnancy Z34.90    Encounter for other suspected maternal and fetal conditions ruled out Z03.79    37 weeks gestation of pregnancy Z3A.37    Term  delivered by , current hospitalization Z38.01    Calculus of gallbladder without cholecystitis without obstruction K80.20       Past Medical History:        Diagnosis Date    ADHD (attention deficit hyperactivity disorder)     Calculus  GLUCOSE 86 01/04/2024 01:11 PM    GLUCOSE 80 08/22/2023 10:35 AM    PROT 7.7 01/04/2024 01:11 PM    CALCIUM 9.7 01/04/2024 01:11 PM    BILITOT 0.2 01/04/2024 01:11 PM    ALKPHOS 105 01/04/2024 01:11 PM    ALKPHOS 107 08/22/2023 10:35 AM    AST Hemolyzed, Recollection Recommended 01/04/2024 01:11 PM    ALT 15 01/04/2024 01:11 PM       POC Tests:   Recent Labs     02/09/24  0852   POCGLU 112*       Coags: No results found for: \"PROTIME\", \"INR\", \"APTT\"    HCG (If Applicable):   Lab Results   Component Value Date    PREGTESTUR Negative 02/09/2024        ABGs: No results found for: \"PHART\", \"PO2ART\", \"KZW2MIU\", \"HUJ8GRG\", \"BEART\", \"R8MGYCWV\"     Type & Screen (If Applicable):  No results found for: \"LABABO\", \"LABRH\"    Drug/Infectious Status (If Applicable):  No results found for: \"HIV\", \"HEPCAB\"    COVID-19 Screening (If Applicable): No results found for: \"COVID19\"        Anesthesia Evaluation  Patient summary reviewed and Nursing notes reviewed   no history of anesthetic complications:   Airway: Mallampati: I  TM distance: >3 FB   Neck ROM: full  Mouth opening: > = 3 FB   Dental: normal exam         Pulmonary:Negative Pulmonary ROS and normal exam  breath sounds clear to auscultation                             Cardiovascular:    (+) hypertension:        Rhythm: regular  Rate: normal                 ROS comment: Pregnancy related     Neuro/Psych:   (+) headaches:, psychiatric history:            GI/Hepatic/Renal: Neg GI/Hepatic/Renal ROS            Endo/Other: Negative Endo/Other ROS                    Abdominal:              PE comment: Deferred.   Vascular: negative vascular ROS.         Other Findings:             Anesthesia Plan      general     ASA 2     (Standard ASA monitors: continuous EKG, BP, HR, pulse oximeter, temperature, and capnography.)  Induction: intravenous.    MIPS: Postoperative opioids intended and Prophylactic antiemetics administered.  Anesthetic plan and risks discussed with patient (and

## 2024-02-09 NOTE — PERIOP NOTE
Patient alert and oriented x 4 with respirations even and unlabored on RA. Patient discharged home per physician's order. Patient transported via wheelchair to front hospital entrance with patient's family present to transport patient via private vehicle.

## 2024-02-09 NOTE — INTERVAL H&P NOTE
Update History & Physical    The patient's History and Physical of January 16, 2024 was reviewed with the patient and I examined the patient. There was no change. The surgical site was confirmed by the patient and me.     Plan: The risks, benefits, expected outcome, and alternative to the recommended procedure have been discussed with the patient. Patient understands and wants to proceed with the procedure.     Electronically signed by DARINEL CRAWFORD MD on 2/9/2024 at 9:53 AM

## 2024-02-09 NOTE — H&P (VIEW-ONLY)
OFFICE VISIT NOTE    Candice Mclaughlin is a 23 y.o. female who presents to the office today for:    Chief Complaint   Patient presents with    New Patient     Gallbladder issues complains of burning        The patient is very pleasant 23-year-old female who was recently seen in the emergency department with complaints of right upper quadrant pain rating to the back as well as some nausea.  She did a workup she had an ultrasound which demonstrated cholelithiasis.  Today in the office she states that she having pain intermittently for quite some time.  She has been tolerating diet.  She denies vomiting.  She denies any change in her stools.        Past Medical History:   Diagnosis Date    ADHD (attention deficit hyperactivity disorder)     Calculus of kidney     Headache     Hypertension     during pregnancy    PCOS (polycystic ovarian syndrome)        Past Surgical History:   Procedure Laterality Date     SECTION N/A 2023     SECTION performed by Becca Lopez MD at [facility] L&D OR       Family History   Problem Relation Age of Onset    Hypertension Mother        Social History     Socioeconomic History    Marital status: Single     Spouse name: Not on file    Number of children: Not on file    Years of education: Not on file    Highest education level: Not on file   Occupational History    Not on file   Tobacco Use    Smoking status: Never     Passive exposure: Never    Smokeless tobacco: Never   Vaping Use    Vaping Use: Some days   Substance and Sexual Activity    Alcohol use: Never    Drug use: Never    Sexual activity: Yes     Partners: Male     Birth control/protection: None   Other Topics Concern    Not on file   Social History Narrative    Not on file     Social Determinants of Health     Financial Resource Strain: Not on file   Food Insecurity: Not on file (2023)  Content: Thought content normal.         Judgment: Judgment normal.          Problem List Items Addressed This Visit          Digestive    Calculus of gallbladder without cholecystitis without obstruction - Primary       Assessment and Plan:    I had extensive discussion with the patient and recommended laparoscopic cholecystectomy.  I reviewed the procedure with including the risk and benefits.  I reviewed the risk of infection, bleeding, wound complications, injury to other intra-abdominal organs and structures including common bile duct, possible conversion to open procedure.  She wishes to proceed and surgery scheduled for February 9, 2024.              DARINEL CRAWFORD MD

## 2024-02-09 NOTE — DISCHARGE INSTRUCTIONS
No lifting greater than 15 pounds, no strenuous activity  May shower starting tomorrow, no tub baths  Follow-up my office 2 weeks    Last dose oxycodone administered on 2-9-24 at 12:18 p.m.   Next dose of post-op pain medication (percocet) available today on 2-9-24 after 4:18 p.m. as needed for pain

## 2024-02-09 NOTE — PROGRESS NOTES
OFFICE VISIT NOTE    Candice Mclaughlin is a 23 y.o. female who presents to the office today for:    Chief Complaint   Patient presents with    New Patient     Gallbladder issues complains of burning        The patient is very pleasant 23-year-old female who was recently seen in the emergency department with complaints of right upper quadrant pain rating to the back as well as some nausea.  She did a workup she had an ultrasound which demonstrated cholelithiasis.  Today in the office she states that she having pain intermittently for quite some time.  She has been tolerating diet.  She denies vomiting.  She denies any change in her stools.        Past Medical History:   Diagnosis Date    ADHD (attention deficit hyperactivity disorder)     Calculus of kidney     Headache     Hypertension     during pregnancy    PCOS (polycystic ovarian syndrome)        Past Surgical History:   Procedure Laterality Date     SECTION N/A 2023     SECTION performed by Becca Lopez MD at [facility] L&D OR       Family History   Problem Relation Age of Onset    Hypertension Mother        Social History     Socioeconomic History    Marital status: Single     Spouse name: Not on file    Number of children: Not on file    Years of education: Not on file    Highest education level: Not on file   Occupational History    Not on file   Tobacco Use    Smoking status: Never     Passive exposure: Never    Smokeless tobacco: Never   Vaping Use    Vaping Use: Some days   Substance and Sexual Activity    Alcohol use: Never    Drug use: Never    Sexual activity: Yes     Partners: Male     Birth control/protection: None   Other Topics Concern    Not on file   Social History Narrative    Not on file     Social Determinants of Health     Financial Resource Strain: Not on file   Food Insecurity: Not on file (2023)

## 2024-02-10 DIAGNOSIS — K80.20 CALCULUS OF GALLBLADDER WITHOUT CHOLECYSTITIS WITHOUT OBSTRUCTION: Primary | ICD-10-CM

## 2024-02-10 RX ORDER — HYDROMORPHONE HYDROCHLORIDE 2 MG/1
2 TABLET ORAL EVERY 4 HOURS PRN
Qty: 18 TABLET | Refills: 0 | Status: SHIPPED | OUTPATIENT
Start: 2024-02-10 | End: 2024-02-15

## 2024-02-10 RX ORDER — ONDANSETRON 4 MG/1
4 TABLET, ORALLY DISINTEGRATING ORAL 3 TIMES DAILY PRN
Qty: 21 TABLET | Refills: 0 | Status: SHIPPED | OUTPATIENT
Start: 2024-02-10

## 2024-02-17 NOTE — ANESTHESIA POSTPROCEDURE EVALUATION
Department of Anesthesiology  Postprocedure Note    Patient: Candice Mlcaughlin  MRN: [id number]  YOB: 2000    Procedure Summary       Date: 02/09/24 Room / Location: [facility] MAIN OR 02 / [facility] MAIN OR    Anesthesia Start: 1011 Anesthesia Stop: 1120    Procedure: LAPAROSCOPIC CHOLECYSTECTOMY (Abdomen) Diagnosis:       Calculus of gallbladder w/o mention of cholecystitis or obstruction      (Calculus of gallbladder w/o mention of cholecystitis or obstruction [K80.20])    Surgeons: Terese Oleary MD Responsible Provider: Mendoza Cabral MD    Anesthesia Type: general ASA Status: 2            Anesthesia Type: No value filed.    Misti Phase I: Misti Score: 9    Misti Phase II: Misti Score: 10    Anesthesia Post Evaluation    Patient location during evaluation: PACU  Patient participation: complete - patient cannot participate  Level of consciousness: awake  Pain score: 0  Airway patency: patent  Nausea & Vomiting: no nausea and no vomiting  Cardiovascular status: hemodynamically stable  Respiratory status: acceptable  Hydration status: stable  Multimodal analgesia pain management approach        No notable events documented.

## 2024-02-22 ENCOUNTER — OFFICE VISIT (OUTPATIENT)
Age: 24
End: 2024-02-22

## 2024-02-22 VITALS
DIASTOLIC BLOOD PRESSURE: 86 MMHG | BODY MASS INDEX: 36.7 KG/M2 | RESPIRATION RATE: 17 BRPM | WEIGHT: 215 LBS | HEART RATE: 100 BPM | HEIGHT: 64 IN | OXYGEN SATURATION: 98 % | TEMPERATURE: 98.2 F | SYSTOLIC BLOOD PRESSURE: 132 MMHG

## 2024-02-22 DIAGNOSIS — K80.20 CALCULUS OF GALLBLADDER WITHOUT CHOLECYSTITIS WITHOUT OBSTRUCTION: Primary | ICD-10-CM

## 2024-02-22 PROCEDURE — 99024 POSTOP FOLLOW-UP VISIT: CPT | Performed by: COLON & RECTAL SURGERY

## 2024-02-22 ASSESSMENT — PATIENT HEALTH QUESTIONNAIRE - PHQ9
SUM OF ALL RESPONSES TO PHQ QUESTIONS 1-9: 0
SUM OF ALL RESPONSES TO PHQ QUESTIONS 1-9: 0
SUM OF ALL RESPONSES TO PHQ9 QUESTIONS 1 & 2: 0
2. FEELING DOWN, DEPRESSED OR HOPELESS: NOT AT ALL
SUM OF ALL RESPONSES TO PHQ QUESTIONS 1-9: 0
SUM OF ALL RESPONSES TO PHQ QUESTIONS 1-9: 0
1. LITTLE INTEREST OR PLEASURE IN DOING THINGS: NOT AT ALL

## 2024-02-22 NOTE — PROGRESS NOTES
Identified pt with two pt identifiers (name and ). Reviewed chart in preparation for visit and have obtained necessary documentation.    Candice Mclaughlin is a 23 y.o. female  Chief Complaint   Patient presents with    Post-Op Check     Lap Ana Lilia      /86 (Site: Left Upper Arm, Position: Sitting)   Pulse 100   Temp 98.2 °F (36.8 °C) (Temporal)   Resp 17   Ht 1.626 m (5' 4\")   Wt 97.5 kg (215 lb)   LMP 2024   SpO2 98%   BMI 36.90 kg/m²     1. Have you been to the ER, urgent care clinic since your last visit?  Hospitalized since your last visit?NO    2. Have you seen or consulted any other health care providers outside of the [facility] since your last visit?  Include any pap smears or colon screening. NO

## 2024-02-23 NOTE — OP NOTE
Operative Note      Patient: Candice Mclaughlin  YOB: 2000  MRN: [id number]    Date of Procedure: 2/9/2024    Pre-Op Diagnosis Codes:     * Calculus of gallbladder w/o mention of cholecystitis or obstruction [K80.20]    Post-Op Diagnosis: Same       Procedure(s):  LAPAROSCOPIC CHOLECYSTECTOMY    Surgeon(s):  Terese Oleary MD    Assistant:   * No surgical staff found *    Anesthesia: General    Estimated Blood Loss (mL): less than 50     Complications: None    Specimens:   ID Type Source Tests Collected by Time Destination   1 : gallbladder Tissue Gallbladder SURGICAL PATHOLOGY Terese Oleary MD 2/9/2024 1107        Implants:  * No implants in log *      Drains: * No LDAs found *    Findings: Thin-walled gallbladder with multiple small stone    Detailed Description of Procedure: The patient was brought to the operating room and positioned on the OR table in the supine position.  Following the induction of general anesthesia the abdomen was prepped and draped in the standard sterile fashion.  A surgical timeout was performed at which time the patient's identity and surgical procedure were once again confirmed.    A small supraumbilical incision was made and taken down through the subcutaneous tissues  with electrocautery.  The fascia was grasped and elevated into Kocher clamps and sharply size a scalpel.  A Candis clamp was used to bluntly pass abdominal cavity and 0 Vicryl sutures placed  on either side of the fascial opening.  Fuentes trocar was introduced and secured in place.  Abdomen insufflated pressure 15 mm.  Laparoscope was introduced and the patient positioned in the reverse Trendelenburg position with left.    After infiltrating local anesthetic,two 5 mm right upper quadrant ports and one 5 mm subxiphoid port were inserted under direct visualization.    A grasper was placed on the fundus gallbladder retracted upward to the right and a second grasper placed the infundibulum.

## 2024-04-15 ENCOUNTER — OFFICE VISIT (OUTPATIENT)
Age: 24
End: 2024-04-15
Payer: COMMERCIAL

## 2024-04-15 VITALS
DIASTOLIC BLOOD PRESSURE: 75 MMHG | HEIGHT: 64 IN | WEIGHT: 212.13 LBS | BODY MASS INDEX: 36.22 KG/M2 | SYSTOLIC BLOOD PRESSURE: 134 MMHG

## 2024-04-15 DIAGNOSIS — Z11.51 SCREENING FOR HUMAN PAPILLOMAVIRUS: ICD-10-CM

## 2024-04-15 DIAGNOSIS — Z11.3 SCREENING FOR VENEREAL DISEASE: ICD-10-CM

## 2024-04-15 DIAGNOSIS — Z01.419 PAP SMEAR, AS PART OF ROUTINE GYNECOLOGICAL EXAMINATION: ICD-10-CM

## 2024-04-15 DIAGNOSIS — Z00.00 ANNUAL VISIT FOR GENERAL ADULT MEDICAL EXAMINATION WITHOUT ABNORMAL FINDINGS: Primary | ICD-10-CM

## 2024-04-15 PROCEDURE — 99395 PREV VISIT EST AGE 18-39: CPT | Performed by: OBSTETRICS & GYNECOLOGY

## 2024-04-15 NOTE — PROGRESS NOTES
Candice Mclaughlin is a 23 y.o. female who presents today for the following:  No chief complaint on file.       No Known Allergies    Current Outpatient Medications   Medication Sig Dispense Refill    ondansetron (ZOFRAN-ODT) 4 MG disintegrating tablet Take 1 tablet by mouth 3 times daily as needed for Nausea or Vomiting 21 tablet 0    etonogestrel-ethinyl estradiol (NUVARING) 0.12-0.015 MG/24HR vaginal ring Place 1 each vaginally every 21 days Insert one (1) ring vaginally and leave in place for three (3) weeks, then remove for one (1) week. 3 each 5    amphetamine-dextroamphetamine (ADDERALL XR) 30 MG extended release capsule Take 1 capsule by mouth every morning for 30 days. Max Daily Amount: 30 mg 30 capsule 0     No current facility-administered medications for this visit.       Past Medical History:   Diagnosis Date    ADHD (attention deficit hyperactivity disorder)     Calculus of kidney     Headache     Hypertension     during pregnancy    PCOS (polycystic ovarian syndrome)        Past Surgical History:   Procedure Laterality Date     SECTION N/A 2023     SECTION performed by Becca Lopez MD at [facility] L&D OR    CHOLECYSTECTOMY, LAPAROSCOPIC N/A 2024    LAPAROSCOPIC CHOLECYSTECTOMY performed by Terese Oleary MD at [facility] MAIN OR       Family History   Problem Relation Age of Onset    Hypertension Mother        Social History     Socioeconomic History    Marital status: Single     Spouse name: Not on file    Number of children: Not on file    Years of education: Not on file    Highest education level: Not on file   Occupational History    Not on file   Tobacco Use    Smoking status: Never     Passive exposure: Never    Smokeless tobacco: Never   Vaping Use    Vaping Use: Some days   Substance and Sexual Activity    Alcohol use: Never    Drug use: Never    Sexual activity: Yes     Partners: Male     Birth control/protection: None   Other Topics Concern    Not on file   Social

## 2024-04-24 LAB
., LABCORP: ABNORMAL
C TRACH RRNA CVX QL NAA+PROBE: NEGATIVE
CYTOLOGIST CVX/VAG CYTO: ABNORMAL
CYTOLOGY CVX/VAG DOC CYTO: ABNORMAL
CYTOLOGY CVX/VAG DOC THIN PREP: ABNORMAL
DX ICD CODE: ABNORMAL
DX ICD CODE: ABNORMAL
HPV I/H RISK 4 DNA CVX QL PROBE+SIG AMP: NEGATIVE
Lab: ABNORMAL
N GONORRHOEA RRNA CVX QL NAA+PROBE: NEGATIVE
OTHER STN SPEC: ABNORMAL
PATHOLOGIST CVX/VAG CYTO: ABNORMAL
STAT OF ADQ CVX/VAG CYTO-IMP: ABNORMAL
T VAGINALIS RRNA SPEC QL NAA+PROBE: NEGATIVE

## 2024-05-06 DIAGNOSIS — N89.8 VAGINAL ITCHING: Primary | ICD-10-CM

## 2024-05-06 RX ORDER — FLUCONAZOLE 150 MG/1
150 TABLET ORAL ONCE
Qty: 1 TABLET | Refills: 0 | Status: SHIPPED | OUTPATIENT
Start: 2024-05-06 | End: 2024-05-06

## 2024-05-20 NOTE — PROGRESS NOTES
OFFICE VISIT NOTE    Candice Mclaughlin is a 23 y.o. female who presents to the office today for:    Chief Complaint   Patient presents with    Post-Op Check     Lap Ana Lilia        Patient comes in today for follow-up status post laparoscopic cholecystectomy 2024.  She has no complaints today.  She denies having any pain with oral intake the right upper quadrant she was experiencing prior to surgery.  She does report some mild discomfort at her laparoscopic port sites.  She denies any complications any of the incisions denies any drainage from any of the incisions.        Past Medical History:   Diagnosis Date    ADHD (attention deficit hyperactivity disorder)     Calculus of kidney     Headache     Hypertension     during pregnancy    PCOS (polycystic ovarian syndrome)        Past Surgical History:   Procedure Laterality Date     SECTION N/A 2023     SECTION performed by Becca Lopez MD at [facility] L&D OR    CHOLECYSTECTOMY, LAPAROSCOPIC N/A 2024    LAPAROSCOPIC CHOLECYSTECTOMY performed by Terese Oleary MD at [facility] MAIN OR       Family History   Problem Relation Age of Onset    Hypertension Mother        Social History     Socioeconomic History    Marital status: Single     Spouse name: Not on file    Number of children: Not on file    Years of education: Not on file    Highest education level: Not on file   Occupational History    Not on file   Tobacco Use    Smoking status: Never     Passive exposure: Never    Smokeless tobacco: Never   Vaping Use    Vaping Use: Some days   Substance and Sexual Activity    Alcohol use: Never    Drug use: Never    Sexual activity: Yes     Partners: Male     Birth control/protection: None   Other Topics Concern    Not on file   Social History Narrative    Not on file     Social Determinants of Health     Financial Resource

## 2025-01-11 NOTE — PROGRESS NOTES
FOLLOWED BY MFM  EFW 98%  ELEVATED BP TODAY 154/108 WITH INCREASED SWELLING  2 CM  WILL SEND TO L&D FOR PIH EVAL
GBS NEG
Ketones:NEG // Mohinder Brice //  Kyle Bonds //  Pro:NEG //  Nit:NEG // Leuk: NEG   Pt presents with complaints of headache and increased swelling//GBS NEG//Carolyn
Discharged

## (undated) DEVICE — ELECTRODE PT RET AD L9FT HI MOIST COND ADH HYDRGEL CORDED

## (undated) DEVICE — JELLY,LUBE,STERILE,FLIP TOP,TUBE,4-OZ: Brand: MEDLINE

## (undated) DEVICE — SUTURE ABSRB BRAID COAT UD CT NO 1 36IN VCRL J959H

## (undated) DEVICE — SUTURE SZ 0 27IN 5/8 CIR UR-6  TAPER PT VIOLET ABSRB VICRYL J603H

## (undated) DEVICE — SOLUTION IRRIG 500ML 0.9% SOD CHLO USP POUR PLAS BTL

## (undated) DEVICE — TROCAR: Brand: KII SLEEVE

## (undated) DEVICE — APPLICATOR MEDICATED 26 CC SOLUTION HI LT ORNG CHLORAPREP

## (undated) DEVICE — TUBING INSUFFLATOR HEAT HUMIDIFIED SMK EVAC SET PNEUMOCLEAR

## (undated) DEVICE — GARMENT COMPR STD FOR 17IN CALF UNIF THER FLOTRN

## (undated) DEVICE — TOWEL SURG W17XL27IN STD BLU COT NONFENESTRATED PREWASHED

## (undated) DEVICE — STERILE POLYISOPRENE POWDER-FREE SURGICAL GLOVES WITH EMOLLIENT COATING: Brand: PROTEXIS

## (undated) DEVICE — ADHESIVE SKIN CLSR 0.7ML TOP DERMBND ADV

## (undated) DEVICE — SOLUTION IRRIGATION H2O 0797305] ICU MEDICAL INC]

## (undated) DEVICE — AGENT HEMSTAT 3GM OXIDIZED REGENERATED CELOS ABSRB FOR CONT (ORDER MULTIPLES OF 5EA)

## (undated) DEVICE — MARKER,SKIN,WI/RULER AND LABELS: Brand: MEDLINE

## (undated) DEVICE — SUTURE VCRL + SZ 2-0 L36IN ABSRB UD L36MM CT-1 1/2 CIR VCP945H

## (undated) DEVICE — HYPODERMIC SAFETY NEEDLE: Brand: MONOJECT

## (undated) DEVICE — TISSUE RETRIEVAL SYSTEM: Brand: INZII RETRIEVAL SYSTEM

## (undated) DEVICE — LARGE, DISPOSABLE ALEXIS O C-SECTION PROTECTOR - RETRACTOR: Brand: ALEXIS ® O C-SECTION PROTECTOR - RETRACTOR

## (undated) DEVICE — APPLICATOR MEDICATED 26 CC SOLUTION SCRB TEAL CHLORAPREP

## (undated) DEVICE — PUMP SUC IRR TBNG L10FT W/ HNDPC ASSEMB STRYKEFLOW 2

## (undated) DEVICE — BLADE,CARBON-STEEL,11,STRL,DISPOSABLE,TB: Brand: MEDLINE

## (undated) DEVICE — CYSTOSCOPY PACK: Brand: CONVERTORS

## (undated) DEVICE — TUBING IRRIG L77IN DIA0.241IN L BOR FOR CYSTO W/ NVENT

## (undated) DEVICE — LIQUIBAND RAPID ADHESIVE 36/CS 0.8ML: Brand: MEDLINE

## (undated) DEVICE — INFECTION CONTROL KIT SYS

## (undated) DEVICE — GDWIRE URET STR STD .035X150 -- ZIPWIRE STD

## (undated) DEVICE — BAG,SPONGE COUNTER,CLEAR,50/BX,5BX/CS: Brand: MEDLINE

## (undated) DEVICE — APPLIER CLP M/L SHFT DIA5MM 15 LIG LIGAMAX 5

## (undated) DEVICE — TRAY URIN CATH PED 16FR BLLN 5CC INDWL STR TIP INF CTRL

## (undated) DEVICE — TROCAR: Brand: KII FIOS FIRST ENTRY

## (undated) DEVICE — SUTURE MCRYL + SZ 4-0 L27IN ABSRB UD L19MM PS-2 3/8 CIR MCP426H

## (undated) DEVICE — TROCARS: Brand: KII® BALLOON BLUNT TIP SYSTEM

## (undated) DEVICE — SUTURE ABSORBABLE BRAIDED 0 CT 36 IN DA UD VICRYL VCP958H

## (undated) DEVICE — SYR 10ML LUER LOK 1/5ML GRAD --

## (undated) DEVICE — INTENDED FOR TISSUE SEPARATION, AND OTHER PROCEDURES THAT REQUIRE A SHARP SURGICAL BLADE TO PUNCTURE OR CUT.: Brand: BARD-PARKER SAFETY BLADES SIZE 10, STERILE

## (undated) DEVICE — SOLUTION IRRIG 1000ML 0.9% SOD CHL USP POUR PLAS BTL

## (undated) DEVICE — SPONGE GZ W4XL4IN COT 12 PLY TYP VII WVN C FLD DSGN

## (undated) DEVICE — C-SECTION: Brand: MEDLINE INDUSTRIES, INC.

## (undated) DEVICE — SURGICEL ENDOSCP APPL

## (undated) DEVICE — GARMENT,MEDLINE,DVT,INT,CALF,MED, GEN2: Brand: MEDLINE

## (undated) DEVICE — STERILE POLYISOPRENE POWDER-FREE SURGICAL GLOVES: Brand: PROTEXIS

## (undated) DEVICE — SOLUTION IRRIG 1000ML H2O STRL BLT

## (undated) DEVICE — GOWN,NON-REINFORCED,XXL: Brand: MEDLINE

## (undated) DEVICE — CANISTER, RIGID, 3000CC: Brand: MEDLINE INDUSTRIES, INC.

## (undated) DEVICE — CORD ES L10FT MPLR LAP

## (undated) DEVICE — SOLUTION SCRB 2OZ 10% POVIDONE IOD ANTIMIC BTL

## (undated) DEVICE — THE STERILE LIGHT HANDLE COVER IS USED WITH STERIS SURGICAL LIGHTING AND VISUALIZATION SYSTEMS.

## (undated) DEVICE — TUBING, SUCTION, 1/4" X 10', STRAIGHT: Brand: MEDLINE

## (undated) DEVICE — GLOVE SURG SZ 8 L12IN FNGR THK79MIL GRN LTX FREE

## (undated) DEVICE — STAPLER SKIN SQ 30 ABSRB STPL DISP INSORB ORDER VIA PHONE OR EMAIL

## (undated) DEVICE — LAPAROSCOPIC SCISSORS: Brand: EPIX LAPAROSCOPIC SCISSORS

## (undated) DEVICE — LAPAROSCOPIC CHOLE PACK: Brand: MEDLINE INDUSTRIES, INC.

## (undated) DEVICE — SOLUTION IV 1000ML 0.9% SOD CHL PH 5 INJ USP VIAFLX PLAS

## (undated) DEVICE — ULNAR NERVE PROTECTOR FOAM POSITIONER: Brand: CARDINAL HEALTH

## (undated) DEVICE — STRAP,POSITIONING,KNEE/BODY,FOAM,4X60": Brand: MEDLINE

## (undated) DEVICE — GOWN,SIRUS,FABRNF,XL,20/CS: Brand: MEDLINE

## (undated) DEVICE — SOUTHSIDE TURNOVER: Brand: MEDLINE INDUSTRIES, INC.